# Patient Record
Sex: FEMALE | Race: WHITE | NOT HISPANIC OR LATINO | Employment: UNEMPLOYED | URBAN - METROPOLITAN AREA
[De-identification: names, ages, dates, MRNs, and addresses within clinical notes are randomized per-mention and may not be internally consistent; named-entity substitution may affect disease eponyms.]

---

## 2017-02-21 ENCOUNTER — HOSPITAL ENCOUNTER (EMERGENCY)
Facility: MEDICAL CENTER | Age: 22
End: 2017-02-21
Attending: EMERGENCY MEDICINE
Payer: MEDICAID

## 2017-02-21 VITALS
HEART RATE: 94 BPM | BODY MASS INDEX: 29.4 KG/M2 | HEIGHT: 68 IN | OXYGEN SATURATION: 97 % | TEMPERATURE: 98.8 F | RESPIRATION RATE: 18 BRPM | WEIGHT: 194 LBS | SYSTOLIC BLOOD PRESSURE: 127 MMHG | DIASTOLIC BLOOD PRESSURE: 81 MMHG

## 2017-02-21 DIAGNOSIS — L02.91 ABSCESS: ICD-10-CM

## 2017-02-21 PROCEDURE — 99284 EMERGENCY DEPT VISIT MOD MDM: CPT

## 2017-02-21 PROCEDURE — A9270 NON-COVERED ITEM OR SERVICE: HCPCS | Performed by: EMERGENCY MEDICINE

## 2017-02-21 PROCEDURE — 700102 HCHG RX REV CODE 250 W/ 637 OVERRIDE(OP): Performed by: EMERGENCY MEDICINE

## 2017-02-21 RX ORDER — SULFAMETHOXAZOLE AND TRIMETHOPRIM 800; 160 MG/1; MG/1
1 TABLET ORAL 2 TIMES DAILY
Qty: 20 TAB | Refills: 0 | Status: SHIPPED | OUTPATIENT
Start: 2017-02-21 | End: 2017-03-03

## 2017-02-21 RX ORDER — SULFAMETHOXAZOLE AND TRIMETHOPRIM 800; 160 MG/1; MG/1
1 TABLET ORAL ONCE
Status: COMPLETED | OUTPATIENT
Start: 2017-02-21 | End: 2017-02-21

## 2017-02-21 RX ORDER — HYDROCODONE BITARTRATE AND ACETAMINOPHEN 5; 325 MG/1; MG/1
1 TABLET ORAL ONCE
Status: COMPLETED | OUTPATIENT
Start: 2017-02-21 | End: 2017-02-21

## 2017-02-21 RX ORDER — CEPHALEXIN 250 MG/1
500 CAPSULE ORAL ONCE
Status: COMPLETED | OUTPATIENT
Start: 2017-02-21 | End: 2017-02-21

## 2017-02-21 RX ORDER — CEPHALEXIN 500 MG/1
500 CAPSULE ORAL 4 TIMES DAILY
Qty: 40 CAP | Refills: 0 | Status: SHIPPED | OUTPATIENT
Start: 2017-02-21 | End: 2017-03-03

## 2017-02-21 RX ORDER — OXYCODONE HYDROCHLORIDE AND ACETAMINOPHEN 5; 325 MG/1; MG/1
1-2 TABLET ORAL EVERY 6 HOURS PRN
Qty: 12 TAB | Refills: 0 | Status: SHIPPED | OUTPATIENT
Start: 2017-02-21 | End: 2017-12-18

## 2017-02-21 RX ADMIN — SULFAMETHOXAZOLE AND TRIMETHOPRIM 1 TABLET: 800; 160 TABLET ORAL at 21:53

## 2017-02-21 RX ADMIN — CEPHALEXIN 500 MG: 250 CAPSULE ORAL at 21:53

## 2017-02-21 RX ADMIN — HYDROCODONE BITARTRATE AND ACETAMINOPHEN 1 TABLET: 5; 325 TABLET ORAL at 21:53

## 2017-02-21 ASSESSMENT — PAIN SCALES - GENERAL
PAINLEVEL_OUTOF10: 10
PAINLEVEL_OUTOF10: 5

## 2017-02-21 NOTE — ED AVS SNAPSHOT
2/21/2017          Tia Marie Cryer  [address] [place] [id number]    Dear Brandee:    [facility] wants to ensure your discharge home is safe and you or your loved ones have had all your questions answered regarding your care after you leave the hospital.    You may receive a telephone call within two days of your discharge.  This call is to make certain you understand your discharge instructions as well as ensure we provided you with the best care possible during your stay with us.     The call will only last approximately 3-5 minutes and will be done by a nurse.    Once again, we want to ensure your discharge home is safe and that you have a clear understanding of any next steps in your care.  If you have any questions or concerns, please do not hesitate to contact us, we are here for you.  Thank you for choosing [facility] for your healthcare needs.    Sincerely,    Sam Holloway    [facility]

## 2017-02-21 NOTE — ED AVS SNAPSHOT
Home Care Instructions                                                                                                                Tia Marie Cryer   MRN: [id number]    Department:  [facility], Emergency Dept   Date of Visit:  2/21/2017            [facility], Emergency Dept    [address]    [place] [address]    Phone:  [phone]      You were seen by     Filippo Duong M.D.      Your Diagnosis Was     Abscess     L02.91       These are the medications you received during your hospitalization from 02/21/2017 1843 to 02/21/2017 2205     Date/Time Order Dose Route Action    02/21/2017 2153 sulfamethoxazole-trimethoprim (BACTRIM DS) 800-160 MG tablet 1 Tab 1 Tab Oral Given    02/21/2017 2153 cephALEXin (KEFLEX) capsule 500 mg 500 mg Oral Given    02/21/2017 2153 hydrocodone-acetaminophen (NORCO) 5-325 MG per tablet 1 Tab 1 Tab Oral Given      Follow-up Information     1. Follow up with [facility], Emergency Dept.    Specialty:  Emergency Medicine    Why:  any time for further treatment or evaluation    Contact information    [id number] [id number] [phone]      Medication Information     Review all of your home medications and newly ordered medications with your primary doctor and/or pharmacist as soon as possible. Follow medication instructions as directed by your doctor and/or pharmacist.     Please keep your complete medication list with you and share with your physician. Update the information when medications are discontinued, doses are changed, or new medications (including over-the-counter products) are added; and carry medication information at all times in the event of emergency situations.               Medication List      START taking these medications        Instructions    cephALEXin 500 MG Caps   Commonly known as:  KEFLEX    Take 1 Cap by mouth 4 times a day for 10 days.   Dose:  500  mg       oxycodone-acetaminophen 5-325 MG Tabs   Commonly known as:  PERCOCET    Take 1-2 Tabs by mouth every 6 hours as needed (pain).   Dose:  1-2 Tab       sulfamethoxazole-trimethoprim 800-160 MG tablet   Commonly known as:  BACTRIM DS    Take 1 Tab by mouth 2 times a day for 10 days.   Dose:  1 Tab         ASK your doctor about these medications        Instructions    Adapalene-Benzoyl Peroxide 0.1-2.5 % Gel    Apply to face bid       albuterol 108 (90 BASE) MCG/ACT Aers inhalation aerosol    Inhale 2 Puffs by mouth every 6 hours as needed for Shortness of Breath.   Dose:  2 Puff       doxycycline 100 MG Tabs   Commonly known as:  VIBRAMYCIN    Take 1 Tab by mouth 2 times a day.   Dose:  100 mg       fluocinonide 0.05 % Crea   Commonly known as:  LIDEX    Apply to rash bid       fluticasone-salmeterol 100-50 MCG/DOSE Aepb   Commonly known as:  ADVAIR    Inhale 1 Puff by mouth every 12 hours.   Dose:  1 Puff                 Discharge Instructions       Please have us or your doctor look at it on Thursday.  Please return at any time if you would like it drained.    Abscess  An abscess is an infected area that contains a collection of pus and debris. It can occur in almost any part of the body. An abscess is also known as a furuncle or boil.  CAUSES   An abscess occurs when tissue gets infected. This can occur from blockage of oil or sweat glands, infection of hair follicles, or a minor injury to the skin. As the body tries to fight the infection, pus collects in the area and creates pressure under the skin. This pressure causes pain. People with weakened immune systems have difficulty fighting infections and get certain abscesses more often.   SYMPTOMS  Usually an abscess develops on the skin and becomes a painful mass that is red, warm, and tender. If the abscess forms under the skin, you may feel a moveable soft area under the skin. Some abscesses break open (rupture) on their own, but most will continue to get  worse without care. The infection can spread deeper into the body and eventually into the bloodstream, causing you to feel ill.   DIAGNOSIS   Your caregiver will take your medical history and perform a physical exam. A sample of fluid may also be taken from the abscess to determine what is causing your infection.  TREATMENT   Your caregiver may prescribe antibiotic medicines to fight the infection. However, taking antibiotics alone usually does not cure an abscess. Your caregiver may need to make a small cut (incision) in the abscess to drain the pus. In some cases, gauze is packed into the abscess to reduce pain and to continue draining the area.  HOME CARE INSTRUCTIONS   · Only take over-the-counter or prescription medicines for pain, discomfort, or fever as directed by your caregiver.  · If you were prescribed antibiotics, take them as directed. Finish them even if you start to feel better.  · If gauze is used, follow your caregiver's directions for changing the gauze.  · To avoid spreading the infection:  ¨ Keep your draining abscess covered with a bandage.  ¨ Wash your hands well.  ¨ Do not share personal care items, towels, or whirlpools with others.  ¨ Avoid skin contact with others.  · Keep your skin and clothes clean around the abscess.  · Keep all follow-up appointments as directed by your caregiver.  SEEK MEDICAL CARE IF:   · You have increased pain, swelling, redness, fluid drainage, or bleeding.  · You have muscle aches, chills, or a general ill feeling.  · You have a fever.  MAKE SURE YOU:   · Understand these instructions.  · Will watch your condition.  · Will get help right away if you are not doing well or get worse.     This information is not intended to replace advice given to you by your health care provider. Make sure you discuss any questions you have with your health care provider.     Document Released: 09/27/2006 Document Revised: 06/18/2013 Document Reviewed: 03/01/2013  Karthik  Interactive Patient Education ©2016 [employer]            Patient Information     Patient Information    Following emergency treatment: all patient requiring follow-up care must return either to a private physician or a clinic if your condition worsens before you are able to obtain further medical attention, please return to the emergency room.     Billing Information    At [facility], we work to make the billing process streamlined for our patients.  Our Representatives are here to answer any questions you may have regarding your hospital bill.  If you have insurance coverage and have supplied your insurance information to us, we will submit a claim to your insurer on your behalf.  Should you have any questions regarding your bill, we can be reached online or by phone as follows:  Online: You are able pay your bills online or live chat with our representatives about any billing questions you may have. We are here to help Monday - Friday from 8:00am to 7:30pm and 9:00am - 12:00pm on Saturdays.  Please visit [email or web address]  for more information.   Phone:  [phone] or [phone]    Please note that your emergency physician, surgeon, pathologist, radiologist, anesthesiologist, and other specialists are not employed by [facility] and will therefore bill separately for their services.  Please contact them directly for any questions concerning their bills at the numbers below:     Emergency Physician Services:  [phone]  [facility]:  [phone]  [facility]:  [phone]  [facility]:  [phone]    1. Your final bill may vary from the amount quoted upon discharge if all procedures are not complete at that time, or if your doctor has additional procedures of which we are not aware. You will receive an additional bill if you return to the Emergency Department at [facility] for suture removal regardless of the  facility of which the sutures were placed.     2. Please arrange for settlement of this account at the emergency registration.    3. All self-pay accounts are due in full at the time of treatment.  If you are unable to meet this obligation then payment is expected within 4-5 days.     4. If you have had radiology studies (CT, X-ray, Ultrasound, MRI), you have received a preliminary result during your emergency department visit. Please contact the radiology department [phone] to receive a copy of your final result. Please discuss the Final result with your primary physician or with the follow up physician provided.     Crisis Hotline:  [place] Crisis Hotline:  5-820-TGSWYGJ or 1-607.440.6392  [place] Crisis Hotline:    1-325.408.3522 or 958-819-7190         ED Discharge Follow Up Questions    1. In order to provide you with very good care, we would like to follow up with a phone call in the next few days.  May we have your permission to contact you?     YES /  NO    2. What is the best phone number to call you? (       )_____-__________    3. What is the best time to call you?      Morning  /  Afternoon  /  Evening                   Patient Signature:  ____________________________________________________________    Date:  ____________________________________________________________

## 2017-02-21 NOTE — ED AVS SNAPSHOT
eCircle Access Code: Activation code not generated  Current eCircle Status: Active    [employer]  A secure, online tool to manage your health information     "[employer]’s eCircle® is a secure, online tool that connects you to your personalized health information from the privacy of your home -- day or night - making it very easy for you to manage your healthcare. Once the activation process is completed, you can even access your medical information using the eCircle sara, which is available for free in the Apple Sara store or Google Play store.     eCircle provides the following levels of access (as shown below):   My Chart Features   [facility] Primary Care Doctor [facility]  Specialists [facility]  Urgent  Care Non-[facility]  Primary Care  Doctor   Email your healthcare team securely and privately 24/7 X X X X   Manage appointments: schedule your next appointment; view details of past/upcoming appointments X      Request prescription refills. X      View recent personal medical records, including lab and immunizations X X X X   View health record, including health history, allergies, medications X X X X   Read reports about your outpatient visits, procedures, consult and ER notes X X X X   See your discharge summary, which is a recap of your hospital and/or ER visit that includes your diagnosis, lab results, and care plan. X X       How to register for eCircle:  1. Go to  [email or web address].  2. Click on the Sign Up Now box, which takes you to the New Member Sign Up page. You will need to provide the following information:  a. Enter your eCircle Access Code exactly as it appears at the top of this page. (You will not need to use this code after you’ve completed the sign-up process. If you do not sign up before the expiration date, you must request a new code.)   b. Enter your date of birth.   c. Enter your home email address.   d. Click Submit, and follow the next screen’s instructions.  3. Create a eCircle ID. This will  be your [employer] login ID and cannot be changed, so think of one that is secure and easy to remember.  4. Create a [employer] Computing password. You can change your password at any time.  5. Enter your Password Reset Question and Answer. This can be used at a later time if you forget your password.   6. Enter your e-mail address. This allows you to receive e-mail notifications when new information is available in [employer].  7. Click Sign Up. You can now view your health information.    For assistance activating your [employer] Computing account, call [phone]

## 2017-02-22 NOTE — ED PROVIDER NOTES
"ED Provider Note    CHIEF COMPLAINT   Chief Complaint   Patient presents with   • Wound Infection     Pt states redness to her coccyx area for the last 2 days, pt states \"it is painful\" and \"I am unable to seat for long periods\"       HPI   Tia Marie Cryer is a 21 y.o. female who presents with a chief complaint of pain and redness and swelling. It is above the buttocks. Duration has been for 2 days. It is worse when she puts pressure on it. It is better when she is lying on her stomach. There is no associated fever or chills. No discharge. No trauma.    Patient was asleep initially when she awoke she was tearful stating that she did not want to have incision and drainage.     REVIEW OF SYSTEMS   General: No fever or chills.  Dermatologic: See above  Neurologic: No weakness or numbness.  Psychiatric: Positive for anxiety          PAST MEDICAL HISTORY   Past Medical History   Diagnosis Date   • Rapid or irregular heartbeat      per pt double heartbeat when she was a child   • Back pain 7/22/2013   • Lumbar back pain 7/22/2013   • Allergy, unspecified not elsewhere classified    • Anxiety    • Depression    • Arrhythmia    • GERD (gastroesophageal reflux disease)    • Headache(784.0)    • Headache, classical migraine    • IBD (inflammatory bowel disease)    • Substance abuse      opiates   • Urinary tract infection, site not specified        FAMILY HISTORY  Family History   Problem Relation Age of Onset   • Cancer Father      melanoma   • Arthritis Father      gout   • Diabetes Maternal Aunt    • Heart Disease Maternal Aunt    • Hypertension Maternal Aunt    • Hyperlipidemia Maternal Aunt    • Diabetes Maternal Grandmother    • Stroke Maternal Grandmother    • Heart Disease Maternal Grandmother    • Hypertension Maternal Grandmother    • Hyperlipidemia Maternal Grandmother    • Cancer Maternal Grandmother      breast   • Diabetes Mother    • Heart Disease Mother    • Hypertension Mother    • Hyperlipidemia Mother    • " "Lung Disease Mother      smoker   • Alcohol/Drug Mother      etoh   • Psychiatry Sister      anxiety   • Heart Disease Maternal Grandfather    • Hypertension Maternal Grandfather    • Hyperlipidemia Maternal Grandfather    • Arthritis Paternal Grandmother      osteoporosis   • Psychiatry Paternal Grandfather      sucide   • Psychiatry Sister      depression   • Lung Disease Maternal Uncle      Throat, lung   • Cancer Maternal Uncle      throat, lung   • Alcohol/Drug Maternal Uncle      etoh       SOCIAL HISTORY  Social History     Social History   • Marital Status: Single     Spouse Name: N/A   • Number of Children: N/A   • Years of Education: N/A     Social History Main Topics   • Smoking status: Current Every Day Smoker -- 1.00 packs/day for 5 years     Types: Cigarettes   • Smokeless tobacco: Never Used   • Alcohol Use: No      Comment: 2x month   • Drug Use: No   • Sexual Activity:     Partners: Male      Comment: nothing used-want pregnancy     Other Topics Concern   • None     Social History Narrative    ** Merged History Encounter **             SURGICAL HISTORY  History reviewed. No pertinent past surgical history.    CURRENT MEDICATIONS   Home Medications     Reviewed by Theresa Devi R.N. (Registered Nurse) on 02/21/17 at [facility]  Med List Status: Partial    Medication Last Dose Status    Adapalene-Benzoyl Peroxide 0.1-2.5 % GEL  Active    albuterol (VENTOLIN OR PROVENTIL) 108 (90 BASE) MCG/ACT AERS inhalation aerosol  Active    doxycycline (VIBRAMYCIN) 100 MG TABS  Active    fluocinonide (LIDEX) 0.05 % CREA  Active    fluticasone-salmeterol (ADVAIR) 100-50 MCG/DOSE AEPB  Active                ALLERGIES   No Known Allergies    PHYSICAL EXAM  VITAL SIGNS: /80 mmHg  Pulse 99  Temp(Src) 37.1 °C (98.8 °F)  Resp 20  Ht 1.727 m (5' 8\")  Wt 88 kg (194 lb 0.1 oz)  BMI 29.51 kg/m2  SpO2 93%  LMP 01/10/2017 Room air O2: 93    Constitutional: Well developed, Well nourished, No acute distress, Non-toxic " appearance.   Cardiovascular: Normal heart rate, Normal rhythm, No murmurs, No rubs, No gallops.   Thorax & Lungs: Normal breath sounds, No respiratory distress, No wheezing, No chest tenderness.   Skin: Patient has indurated area just above the crease of the gluteus area. She has induration noted. There appears to be small sore on the top active drainage noted. No streaking noted.  Extremities: Intact distal pulses, No edema, No tenderness, No cyanosis, No clubbing.       COURSE & MEDICAL DECISION MAKING  Pertinent Labs & Imaging studies reviewed. (See chart for details)   is here with what appears to be an abscess. At this point, incision and drainage was prepped and the patient converted tearful and anxious. Sat down and we spoke to the patient for approximately 10 minutes regarding the procedure well we did it while redoing it. Patient this point is awake and alert she is anxious but her train of thought is appropriate. She does not want to do the incision and drainage. She'll try antibiotics. I discussed with her that this may not work and occasionally, the infection could spread and cause severe issues including death in the worse case scenario. At this point patient is encouraged to return any time. She is given pain medicine and antibiotics and still refused upon repeat evaluation. I spoke her to return anytime she wants for repeat evaluation and will gladly look at it to make sure tenting in the right direction. She can always follow up with her primary care physician if she feels more comfortable like to watch it closely in the next day or 2. Again patient at this point is appropriate she is awake she is alert to be discharged home. Antibiotics pain medicine    FINAL IMPRESSION  1. Abscess  2.   3.      Electronically signed by: Filippo Duong, 2/21/2017 9:39 PM

## 2017-02-22 NOTE — ED NOTES
"Chief Complaint   Patient presents with   • Wound Infection     Pt states redness to her coccyx area for the last 2 days, pt states \"it is painful\" and \"I am unable to seat for long periods\"     .Blood pressure 125/80, pulse 99, temperature 37.1 °C (98.8 °F), resp. rate 20, height 1.727 m (5' 8\"), weight 88 kg (194 lb 0.1 oz), SpO2 93 %.    "

## 2017-02-22 NOTE — DISCHARGE INSTRUCTIONS
Please have us or your doctor look at it on Thursday.  Please return at any time if you would like it drained.    Abscess  An abscess is an infected area that contains a collection of pus and debris. It can occur in almost any part of the body. An abscess is also known as a furuncle or boil.  CAUSES   An abscess occurs when tissue gets infected. This can occur from blockage of oil or sweat glands, infection of hair follicles, or a minor injury to the skin. As the body tries to fight the infection, pus collects in the area and creates pressure under the skin. This pressure causes pain. People with weakened immune systems have difficulty fighting infections and get certain abscesses more often.   SYMPTOMS  Usually an abscess develops on the skin and becomes a painful mass that is red, warm, and tender. If the abscess forms under the skin, you may feel a moveable soft area under the skin. Some abscesses break open (rupture) on their own, but most will continue to get worse without care. The infection can spread deeper into the body and eventually into the bloodstream, causing you to feel ill.   DIAGNOSIS   Your caregiver will take your medical history and perform a physical exam. A sample of fluid may also be taken from the abscess to determine what is causing your infection.  TREATMENT   Your caregiver may prescribe antibiotic medicines to fight the infection. However, taking antibiotics alone usually does not cure an abscess. Your caregiver may need to make a small cut (incision) in the abscess to drain the pus. In some cases, gauze is packed into the abscess to reduce pain and to continue draining the area.  HOME CARE INSTRUCTIONS   · Only take over-the-counter or prescription medicines for pain, discomfort, or fever as directed by your caregiver.  · If you were prescribed antibiotics, take them as directed. Finish them even if you start to feel better.  · If gauze is used, follow your caregiver's directions for  changing the gauze.  · To avoid spreading the infection:  ¨ Keep your draining abscess covered with a bandage.  ¨ Wash your hands well.  ¨ Do not share personal care items, towels, or whirlpools with others.  ¨ Avoid skin contact with others.  · Keep your skin and clothes clean around the abscess.  · Keep all follow-up appointments as directed by your caregiver.  SEEK MEDICAL CARE IF:   · You have increased pain, swelling, redness, fluid drainage, or bleeding.  · You have muscle aches, chills, or a general ill feeling.  · You have a fever.  MAKE SURE YOU:   · Understand these instructions.  · Will watch your condition.  · Will get help right away if you are not doing well or get worse.     This information is not intended to replace advice given to you by your health care provider. Make sure you discuss any questions you have with your health care provider.     Document Released: 09/27/2006 Document Revised: 06/18/2013 Document Reviewed: 03/01/2013  [employer] Interactive Patient Education ©2016 [employer]

## 2017-12-18 ENCOUNTER — HOSPITAL ENCOUNTER (EMERGENCY)
Facility: MEDICAL CENTER | Age: 22
End: 2017-12-18
Attending: EMERGENCY MEDICINE
Payer: MEDICAID

## 2017-12-18 VITALS
RESPIRATION RATE: 18 BRPM | WEIGHT: 208.78 LBS | HEART RATE: 86 BPM | DIASTOLIC BLOOD PRESSURE: 78 MMHG | BODY MASS INDEX: 30.92 KG/M2 | TEMPERATURE: 99 F | SYSTOLIC BLOOD PRESSURE: 138 MMHG | HEIGHT: 69 IN | OXYGEN SATURATION: 99 %

## 2017-12-18 DIAGNOSIS — L85.3 XEROSIS OF SKIN: ICD-10-CM

## 2017-12-18 DIAGNOSIS — F41.9 ANXIETY: ICD-10-CM

## 2017-12-18 DIAGNOSIS — L73.9 FOLLICULITIS: ICD-10-CM

## 2017-12-18 PROCEDURE — 700102 HCHG RX REV CODE 250 W/ 637 OVERRIDE(OP): Performed by: EMERGENCY MEDICINE

## 2017-12-18 PROCEDURE — 99283 EMERGENCY DEPT VISIT LOW MDM: CPT

## 2017-12-18 PROCEDURE — A9270 NON-COVERED ITEM OR SERVICE: HCPCS | Performed by: EMERGENCY MEDICINE

## 2017-12-18 RX ORDER — CEPHALEXIN 500 MG/1
500 CAPSULE ORAL 3 TIMES DAILY
Qty: 15 CAP | Refills: 0 | Status: SHIPPED | OUTPATIENT
Start: 2017-12-18 | End: 2017-12-18

## 2017-12-18 RX ORDER — DIPHENHYDRAMINE HCL 25 MG
25 TABLET ORAL ONCE
Status: COMPLETED | OUTPATIENT
Start: 2017-12-18 | End: 2017-12-18

## 2017-12-18 RX ORDER — IBUPROFEN 600 MG/1
600 TABLET ORAL ONCE
Status: COMPLETED | OUTPATIENT
Start: 2017-12-18 | End: 2017-12-18

## 2017-12-18 RX ORDER — CEPHALEXIN 500 MG/1
500 CAPSULE ORAL 3 TIMES DAILY
Qty: 15 CAP | Refills: 0 | Status: SHIPPED | OUTPATIENT
Start: 2017-12-18 | End: 2017-12-23

## 2017-12-18 RX ORDER — IBUPROFEN 600 MG/1
600 TABLET ORAL EVERY 6 HOURS PRN
Qty: 30 TAB | Refills: 0 | Status: SHIPPED | OUTPATIENT
Start: 2017-12-18 | End: 2017-12-18

## 2017-12-18 RX ORDER — KETOCONAZOLE 20 MG/G
1 CREAM TOPICAL 2 TIMES DAILY
Qty: 1 TUBE | Refills: 1 | Status: SHIPPED | OUTPATIENT
Start: 2017-12-18

## 2017-12-18 RX ORDER — CETIRIZINE HYDROCHLORIDE 10 MG/1
10 TABLET ORAL DAILY
Qty: 30 TAB | Refills: 0 | Status: SHIPPED | OUTPATIENT
Start: 2017-12-18

## 2017-12-18 RX ORDER — IBUPROFEN 600 MG/1
600 TABLET ORAL EVERY 6 HOURS PRN
Qty: 30 TAB | Refills: 0 | Status: SHIPPED | OUTPATIENT
Start: 2017-12-18 | End: 2019-06-11

## 2017-12-18 RX ORDER — KETOCONAZOLE 20 MG/G
1 CREAM TOPICAL 2 TIMES DAILY
Qty: 1 TUBE | Refills: 1 | Status: SHIPPED | OUTPATIENT
Start: 2017-12-18 | End: 2017-12-18

## 2017-12-18 RX ADMIN — DIPHENHYDRAMINE HCL 25 MG: 25 TABLET ORAL at 22:37

## 2017-12-18 RX ADMIN — IBUPROFEN 600 MG: 600 TABLET, FILM COATED ORAL at 22:37

## 2017-12-18 ASSESSMENT — PAIN SCALES - GENERAL
PAINLEVEL_OUTOF10: 2
PAINLEVEL_OUTOF10: 2

## 2017-12-19 NOTE — DISCHARGE INSTRUCTIONS
Folliculitis  Folliculitis is redness, soreness, and swelling (inflammation) of the hair follicles. This condition can occur anywhere on the body. People with weakened immune systems, diabetes, or obesity have a greater risk of getting folliculitis.  CAUSES  · Bacterial infection. This is the most common cause.  · Fungal infection.  · Viral infection.  · Contact with certain chemicals, especially oils and tars.  Long-term folliculitis can result from bacteria that live in the nostrils. The bacteria may trigger multiple outbreaks of folliculitis over time.  SYMPTOMS  Folliculitis most commonly occurs on the scalp, thighs, legs, back, buttocks, and areas where hair is shaved frequently. An early sign of folliculitis is a small, white or yellow, pus-filled, itchy lesion (pustule). These lesions appear on a red, inflamed follicle. They are usually less than 0.2 inches (5 mm) wide. When there is an infection of the follicle that goes deeper, it becomes a boil or furuncle. A group of closely packed boils creates a larger lesion (carbuncle). Carbuncles tend to occur in hairy, sweaty areas of the body.  DIAGNOSIS   Your caregiver can usually tell what is wrong by doing a physical exam. A sample may be taken from one of the lesions and tested in a lab. This can help determine what is causing your folliculitis.  TREATMENT   Treatment may include:  · Applying warm compresses to the affected areas.  · Taking antibiotic medicines orally or applying them to the skin.  · Draining the lesions if they contain a large amount of pus or fluid.  · Laser hair removal for cases of long-lasting folliculitis. This helps to prevent regrowth of the hair.  HOME CARE INSTRUCTIONS  · Apply warm compresses to the affected areas as directed by your caregiver.  · If antibiotics are prescribed, take them as directed. Finish them even if you start to feel better.  · You may take over-the-counter medicines to relieve itching.  · Do not shave irritated  skin.  · Follow up with your caregiver as directed.  SEEK IMMEDIATE MEDICAL CARE IF:   · You have increasing redness, swelling, or pain in the affected area.  · You have a fever.  MAKE SURE YOU:  · Understand these instructions.  · Will watch your condition.  · Will get help right away if you are not doing well or get worse.     This information is not intended to replace advice given to you by your health care provider. Make sure you discuss any questions you have with your health care provider.     Document Released: 02/26/2003 Document Revised: 01/08/2016 Document Reviewed: 03/19/2013  [employer] Interactive Patient Education ©2016 [employer]

## 2017-12-19 NOTE — ED PROVIDER NOTES
"ED Provider Note    CHIEF COMPLAINT  Chief Complaint   Patient presents with   • Rash     Seen at 10:13 PM    HPI  Tia Marie Cryer is a 22 y.o. female who presentsWith a diffusely pruritic rash across her entire body, she states this is been present since birth. She notes it is worsened over the past few weeks, she denies any new lotions or detergents. She does constant scratch these rashes. She states that she tried some over-the-counter antifungal that did improve the rash. She also states a long time ago she was examined with a ultraviolet light and was told that she had a diffuse body yeast rash.  She also reports painful boils on her bilateral thighs, she states the pain gets so severe that she can barely walk.    REVIEW OF SYSTEMS  See HPI, denies fevers, chills, new medications.  She states she is allergic to sugar.  PAST MEDICAL HISTORY   has a past medical history of Allergy, unspecified not elsewhere classified; Anxiety; Arrhythmia; Back pain (7/22/2013); Depression; GERD (gastroesophageal reflux disease); Headache(784.0); Headache, classical migraine; IBD (inflammatory bowel disease); Lumbar back pain (7/22/2013); Rapid or irregular heartbeat; Substance abuse; and Urinary tract infection, site not specified.    SOCIAL HISTORY  Social History     Social History Main Topics   • Smoking status: Current Every Day Smoker     Packs/day: 1.00     Years: 5.00     Types: Cigarettes   • Smokeless tobacco: Never Used   • Alcohol use Yes      Comment: soc   • Drug use: No   • Sexual activity: Yes     Partners: Male      Comment: nothing used-want pregnancy       SURGICAL HISTORY  patient denies any surgical history    CURRENT MEDICATIONS  Reviewed.  See Encounter Summary.     ALLERGIES  No Known Allergies    PHYSICAL EXAM  VITAL SIGNS: /78   Pulse 86   Temp 37.2 °C (99 °F)   Resp 18   Ht 1.753 m (5' 9\")   Wt 94.7 kg (208 lb 12.4 oz)   SpO2 99%   BMI 30.83 kg/m²   Constitutional: Awake, alert in no " apparent distress.  HENT: Normocephalic, Bilateral external ears normal. Nose normal.No oral pharyngeal involvement.   Eyes: Conjunctiva normal, non-icteric, EOMI.    Thorax & Lungs: Easy unlabored respirations  Cardiovascular:    Abdomen:  No distention  Skin: 3 distinct 1 cm papules, 2 on the left medial thigh, one on the right medial thigh. No surrounding cellulitis. Mild fluctuance noted.  No discernible diffuse body rash was seen, there is mild erythema in the bilateral flanks that appear to be excoriated.   Extremities:   atraumatic   Neurologic: Alert, Grossly non-focal.   Psychiatric: Affect and Mood normal    RADIOLOGY  No orders to display     The radiologist's interpretation of all radiological studies have been reviewed by me.  Nursing notes and vital signs were reviewed. (See chart for details)    Decision Making:  This is a 22 y.o. year old female who presents withComplaints of a rash across her entire body since birth. I do not see any rash, this appears to be cirrhosis. I suspect the patient has some psychogenic scratching that is occurring and causing the erythema. The patient is asking for an antifungal cream, she will be given some ketoconazole but I do not see any evidence of candidate at this time. Tenia corporis seems very unlikely as well. There is not any hypo-pigmented lesions or scaling noted.    She does have 3 small areas of folliculitis. At this time there is no significant fluctuance that would require incision and drainage however  I explained to patient this may develop over the next 48 hours. I do recommend warm compresses and topical antibiotics in addition to Keflex.    She should obtain a primary care physician.  The patient's blood pressure is elevated today. >120/80. I have referred them to primary care for follow up.       DISPOSITION:  Patient will be discharged home in good condition.    Discharge Medications:  Discharge Medication List as of 12/18/2017 11:03 PM      START  taking these medications    Details   mupirocin (BACTROBAN) 2 % Ointment Apply 1 Application to affected area(s) 3 times a day.Apply to painful lesionsDisp-1 Tube, R-0, Normal      cetirizine (ZYRTEC) 10 MG Tab Take 1 Tab by mouth every day., Disp-30 Tab, R-0, Print Rx Paper      ibuprofen (MOTRIN) 600 MG Tab Take 1 Tab by mouth every 6 hours as needed for Moderate Pain., Disp-30 Tab, R-0, Print Rx Paper      ketoconazole (NIZORAL) 2 % Cream Apply 1 Application to affected area(s) 2 times a day., Disp-1 Tube, R-1, Print Rx Paper      cephALEXin (KEFLEX) 500 MG Cap Take 1 Cap by mouth 3 times a day for 5 days., Disp-15 Cap, R-0, Print Rx Paper             The patient was discharged home (see d/c instructions) and told to return immediately for any signs or symptoms listed, or any worsening at all.  The patient verbally agreed to the discharge precautions and follow-up plan which is documented in EPIC.    FINAL IMPRESSION  1. Folliculitis    2. Xerosis of skin    3. Anxiety
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
## 2018-11-27 ENCOUNTER — NON-PROVIDER VISIT (OUTPATIENT)
Dept: URGENT CARE | Facility: PHYSICIAN GROUP | Age: 23
End: 2018-11-27

## 2018-11-27 DIAGNOSIS — Z02.1 PRE-EMPLOYMENT DRUG SCREENING: ICD-10-CM

## 2018-11-27 LAB
AMP AMPHETAMINE: NORMAL
COC COCAINE: NORMAL
INT CON NEG: NORMAL
INT CON POS: NORMAL
MET METHAMPHETAMINES: NORMAL
OPI OPIATES: NORMAL
PCP PHENCYCLIDINE: NORMAL
POC DRUG COMMENT 753798-OCCUPATIONAL HEALTH: NEGATIVE
THC: NORMAL

## 2018-11-27 PROCEDURE — 80305 DRUG TEST PRSMV DIR OPT OBS: CPT | Performed by: PHYSICIAN ASSISTANT

## 2019-01-16 ENCOUNTER — HOSPITAL ENCOUNTER (EMERGENCY)
Facility: MEDICAL CENTER | Age: 24
End: 2019-01-16
Attending: EMERGENCY MEDICINE
Payer: MEDICAID

## 2019-01-16 VITALS
HEIGHT: 68 IN | SYSTOLIC BLOOD PRESSURE: 144 MMHG | WEIGHT: 194 LBS | DIASTOLIC BLOOD PRESSURE: 97 MMHG | RESPIRATION RATE: 20 BRPM | HEART RATE: 101 BPM | OXYGEN SATURATION: 99 % | BODY MASS INDEX: 29.4 KG/M2 | TEMPERATURE: 96.8 F

## 2019-01-16 DIAGNOSIS — F11.93 OPIOID WITHDRAWAL (HCC): ICD-10-CM

## 2019-01-16 PROCEDURE — 99283 EMERGENCY DEPT VISIT LOW MDM: CPT

## 2019-01-16 RX ORDER — CITALOPRAM 20 MG/1
20 TABLET ORAL DAILY
Qty: 30 TAB | Refills: 0 | Status: SHIPPED | OUTPATIENT
Start: 2019-01-16

## 2019-01-16 RX ORDER — CLONIDINE HYDROCHLORIDE 0.1 MG/1
0.1 TABLET ORAL 3 TIMES DAILY PRN
Qty: 10 TAB | Refills: 0 | Status: SHIPPED | OUTPATIENT
Start: 2019-01-16

## 2019-01-16 RX ORDER — BUPROPION HYDROCHLORIDE 100 MG/1
100 TABLET ORAL 2 TIMES DAILY
Qty: 60 TAB | Refills: 0 | Status: SHIPPED | OUTPATIENT
Start: 2019-01-16 | End: 2019-01-16

## 2019-01-16 RX ORDER — BUPROPION HYDROCHLORIDE 100 MG/1
100 TABLET ORAL 2 TIMES DAILY
Qty: 60 TAB | Refills: 0 | Status: SHIPPED | OUTPATIENT
Start: 2019-01-16

## 2019-01-16 RX ORDER — CITALOPRAM 20 MG/1
20 TABLET ORAL DAILY
Qty: 30 TAB | Refills: 0 | Status: SHIPPED | OUTPATIENT
Start: 2019-01-16 | End: 2019-01-16

## 2019-01-16 ASSESSMENT — PAIN SCALES - GENERAL: PAINLEVEL_OUTOF10: 0

## 2019-01-16 NOTE — ED TRIAGE NOTES
Chief Complaint   Patient presents with   • Medical Clearance     pt sent here from rehab/[facility], here to get medically cleared. pt detoxing from heroine. denies any si/hi. c/o feeling shaky. pt aaox4.     Also asking for med refill of her wellbutrin and citalopram. Denies si/hi. Triage process explained. Instructed to notify staff for any worsening symptoms.

## 2019-01-16 NOTE — ED NOTES
Pt discharged home as ordered by erp. Pt instructed to follow up with their PCP and return here as need. Pt given RXs. Pt verbalized understanding and left ambulating independently with family
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
## 2019-01-23 ENCOUNTER — HOSPITAL ENCOUNTER (EMERGENCY)
Facility: MEDICAL CENTER | Age: 24
End: 2019-01-23
Attending: EMERGENCY MEDICINE
Payer: MEDICAID

## 2019-01-23 VITALS
TEMPERATURE: 96.6 F | RESPIRATION RATE: 20 BRPM | HEIGHT: 68 IN | BODY MASS INDEX: 30.07 KG/M2 | OXYGEN SATURATION: 97 % | WEIGHT: 198.41 LBS | SYSTOLIC BLOOD PRESSURE: 132 MMHG | DIASTOLIC BLOOD PRESSURE: 79 MMHG | HEART RATE: 103 BPM

## 2019-01-23 DIAGNOSIS — S80.12XA CONTUSION OF LEFT LOWER LEG, INITIAL ENCOUNTER: ICD-10-CM
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
## 2019-01-23 PROCEDURE — 99284 EMERGENCY DEPT VISIT MOD MDM: CPT

## 2019-01-23 ASSESSMENT — LIFESTYLE VARIABLES: DO YOU DRINK ALCOHOL: NO

## 2019-01-23 NOTE — ED NOTES
This RN brought patient back from waiting room. Pt is alert, oriented and ambulatory with steady gait. This RN requested patient change into gown at this time. Pt compliant.
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
## 2019-01-23 NOTE — ED NOTES
Pt discharge ordered by provider. Pt discharge instructions reviewed with patient by this RN. Pt verbalized understanding of discharge information and of prescription information. Pt discharged alert, oriented, and ambulatory by this RN.

## 2019-06-11 ENCOUNTER — OFFICE VISIT (OUTPATIENT)
Dept: URGENT CARE | Facility: PHYSICIAN GROUP | Age: 24
End: 2019-06-11
Payer: MEDICAID

## 2019-06-11 VITALS
SYSTOLIC BLOOD PRESSURE: 118 MMHG | TEMPERATURE: 97.5 F | HEIGHT: 68 IN | WEIGHT: 204 LBS | DIASTOLIC BLOOD PRESSURE: 70 MMHG | HEART RATE: 92 BPM | BODY MASS INDEX: 30.92 KG/M2 | OXYGEN SATURATION: 100 % | RESPIRATION RATE: 16 BRPM

## 2019-06-11 DIAGNOSIS — K04.7 DENTAL INFECTION: ICD-10-CM

## 2019-06-11 DIAGNOSIS — K08.89 PAIN, DENTAL: ICD-10-CM

## 2019-06-11 PROCEDURE — 99204 OFFICE O/P NEW MOD 45 MIN: CPT | Performed by: PHYSICIAN ASSISTANT

## 2019-06-11 RX ORDER — CLINDAMYCIN HYDROCHLORIDE 300 MG/1
300 CAPSULE ORAL 3 TIMES DAILY
Qty: 30 CAP | Refills: 0 | Status: SHIPPED | OUTPATIENT
Start: 2019-06-11 | End: 2019-06-21

## 2019-06-11 RX ORDER — IBUPROFEN 800 MG/1
800 TABLET ORAL EVERY 8 HOURS PRN
Qty: 30 TAB | Refills: 0 | Status: SHIPPED | OUTPATIENT
Start: 2019-06-11

## 2019-06-11 NOTE — PATIENT INSTRUCTIONS
Steps to Quit Smoking  Smoking tobacco can be harmful to your health and can affect almost every organ in your body. Smoking puts you, and those around you, at risk for developing many serious chronic diseases. Quitting smoking is difficult, but it is one of the best things that you can do for your health. It is never too late to quit.  What are the benefits of quitting smoking?  When you quit smoking, you lower your risk of developing serious diseases and conditions, such as:  · Lung cancer or lung disease, such as COPD.  · Heart disease.  · Stroke.  · Heart attack.  · Infertility.  · Osteoporosis and bone fractures.  Additionally, symptoms such as coughing, wheezing, and shortness of breath may get better when you quit. You may also find that you get sick less often because your body is stronger at fighting off colds and infections. If you are pregnant, quitting smoking can help to reduce your chances of having a baby of low birth weight.  How do I get ready to quit?  When you decide to quit smoking, create a plan to make sure that you are successful. Before you quit:  · Pick a date to quit. Set a date within the next two weeks to give you time to prepare.  · Write down the reasons why you are quitting. Keep this list in places where you will see it often, such as on your bathroom mirror or in your car or wallet.  · Identify the people, places, things, and activities that make you want to smoke (triggers) and avoid them. Make sure to take these actions:  ¨ Throw away all cigarettes at home, at work, and in your car.  ¨ Throw away smoking accessories, such as ashtrays and lighters.  ¨ Clean your car and make sure to empty the ashtray.  ¨ Clean your home, including curtains and carpets.  · Tell your family, friends, and coworkers that you are quitting. Support from your loved ones can make quitting easier.  · Talk with your health care provider about your options for quitting smoking.  · Find out what treatment  options are covered by your health insurance.  What strategies can I use to quit smoking?  Talk with your healthcare provider about different strategies to quit smoking. Some strategies include:  · Quitting smoking altogether instead of gradually lessening how much you smoke over a period of time. Research shows that quitting “cold turkey” is more successful than gradually quitting.  · Attending in-person counseling to help you build problem-solving skills. You are more likely to have success in quitting if you attend several counseling sessions. Even short sessions of 10 minutes can be effective.  · Finding resources and support systems that can help you to quit smoking and remain smoke-free after you quit. These resources are most helpful when you use them often. They can include:  ¨ Online chats with a counselor.  ¨ Telephone quitlines.  ¨ Printed self-help materials.  ¨ Support groups or group counseling.  ¨ Text messaging programs.  ¨ Mobile phone applications.  · Taking medicines to help you quit smoking. (If you are pregnant or breastfeeding, talk with your health care provider first.) Some medicines contain nicotine and some do not. Both types of medicines help with cravings, but the medicines that include nicotine help to relieve withdrawal symptoms. Your health care provider may recommend:  ¨ Nicotine patches, gum, or lozenges.  ¨ Nicotine inhalers or sprays.  ¨ Non-nicotine medicine that is taken by mouth.  Talk with your health care provider about combining strategies, such as taking medicines while you are also receiving in-person counseling. Using these two strategies together makes you more likely to succeed in quitting than if you used either strategy on its own.  If you are pregnant or breastfeeding, talk with your health care provider about finding counseling or other support strategies to quit smoking. Do not take medicine to help you quit smoking unless told to do so by your health care  provider.  What things can I do to make it easier to quit?  Quitting smoking might feel overwhelming at first, but there is a lot that you can do to make it easier. Take these important actions:  · Reach out to your family and friends and ask that they support and encourage you during this time. Call telephone quitlines, reach out to support groups, or work with a counselor for support.  · Ask people who smoke to avoid smoking around you.  · Avoid places that trigger you to smoke, such as bars, parties, or smoke-break areas at work.  · Spend time around people who do not smoke.  · Lessen stress in your life, because stress can be a smoking trigger for some people. To lessen stress, try:  ¨ Exercising regularly.  ¨ Deep-breathing exercises.  ¨ Yoga.  ¨ Meditating.  ¨ Performing a body scan. This involves closing your eyes, scanning your body from head to toe, and noticing which parts of your body are particularly tense. Purposefully relax the muscles in those areas.  · Download or purchase mobile phone or tablet apps (applications) that can help you stick to your quit plan by providing reminders, tips, and encouragement. There are many free apps, such as QuitGuide from the CDC (Centers for Disease Control and Prevention). You can find other support for quitting smoking (smoking cessation) through smokefree.gov and other websites.  How will I feel when I quit smoking?  Within the first 24 hours of quitting smoking, you may start to feel some withdrawal symptoms. These symptoms are usually most noticeable 2-3 days after quitting, but they usually do not last beyond 2-3 weeks. Changes or symptoms that you might experience include:  · Mood swings.  · Restlessness, anxiety, or irritation.  · Difficulty concentrating.  · Dizziness.  · Strong cravings for sugary foods in addition to nicotine.  · Mild weight gain.  · Constipation.  · Nausea.  · Coughing or a sore throat.  · Changes in how your medicines work in your  body.  · A depressed mood.  · Difficulty sleeping (insomnia).  After the first 2-3 weeks of quitting, you may start to notice more positive results, such as:  · Improved sense of smell and taste.  · Decreased coughing and sore throat.  · Slower heart rate.  · Lower blood pressure.  · Clearer skin.  · The ability to breathe more easily.  · Fewer sick days.  Quitting smoking is very challenging for most people. Do not get discouraged if you are not successful the first time. Some people need to make many attempts to quit before they achieve long-term success. Do your best to stick to your quit plan, and talk with your health care provider if you have any questions or concerns.  This information is not intended to replace advice given to you by your health care provider. Make sure you discuss any questions you have with your health care provider.  Document Released: 12/12/2002 Document Revised: 08/15/2017 Document Reviewed: 05/03/2016  [employer] Interactive Patient Education © 2017 [employer]

## 2019-06-11 NOTE — PROGRESS NOTES
Chief Complaint   Patient presents with   • Oral Pain       HISTORY OF PRESENT ILLNESS: Patient is a 24 y.o. female who presents today because she has had pain in her lower jaw ever since losing part of her crown about 4 or 5 months ago when she was in prison however she has had more pain and swelling over the last several days the left lower jaw area.  She is currently in rehab so she has not been taking any medications whatsoever for her symptoms    Patient Active Problem List    Diagnosis Date Noted   • Acne 04/08/2015   • Narcotic addiction (HCC) 04/08/2015   • Anxiety 04/08/2015   • Lumbar back pain 07/22/2013       Allergies:Patient has no known allergies.    Current Outpatient Prescriptions Ordered in [facility]   Medication Sig Dispense Refill   • clindamycin (CLEOCIN) 300 MG Cap Take 1 Cap by mouth 3 times a day for 10 days. 30 Cap 0   • ibuprofen (MOTRIN) 800 MG Tab Take 1 Tab by mouth every 8 hours as needed. 30 Tab 0   • buPROPion (WELLBUTRIN) 100 MG Tab Take 1 Tab by mouth 2 times a day. (Patient not taking: Reported on 6/11/2019) 60 Tab 0   • citalopram (CELEXA) 20 MG Tab Take 1 Tab by mouth every day. (Patient not taking: Reported on 6/11/2019) 30 Tab 0   • cloNIDine (CATAPRES) 0.1 MG Tab Take 1 Tab by mouth 3 times a day as needed. (Patient not taking: Reported on 6/11/2019) 10 Tab 0   • mupirocin (BACTROBAN) 2 % Ointment Apply 1 Application to affected area(s) 3 times a day. 1 Tube 0   • cetirizine (ZYRTEC) 10 MG Tab Take 1 Tab by mouth every day. (Patient not taking: Reported on 6/11/2019) 30 Tab 0   • ketoconazole (NIZORAL) 2 % Cream Apply 1 Application to affected area(s) 2 times a day. 1 Tube 1   • doxycycline (VIBRAMYCIN) 100 MG TABS Take 1 Tab by mouth 2 times a day. 20 Tab 6   • Adapalene-Benzoyl Peroxide 0.1-2.5 % GEL Apply to face bid 1 Tube 3   • albuterol (VENTOLIN OR PROVENTIL) 108 (90 BASE) MCG/ACT AERS inhalation aerosol Inhale 2 Puffs by mouth every 6 hours as needed for Shortness of  Breath. 1 Inhaler 3   • fluticasone-salmeterol (ADVAIR) 100-50 MCG/DOSE AEPB Inhale 1 Puff by mouth every 12 hours. 1 Inhaler 3   • fluocinonide (LIDEX) 0.05 % CREA Apply to rash bid 1 Tube 3     No current Epic-ordered facility-administered medications on file.        Past Medical History:   Diagnosis Date   • Allergy, unspecified not elsewhere classified    • Anxiety    • Arrhythmia    • Back pain 2013   • Depression    • GERD (gastroesophageal reflux disease)    • Headache(784.0)    • Headache, classical migraine    • IBD (inflammatory bowel disease)    • Lumbar back pain 2013   • Rapid or irregular heartbeat     per pt double heartbeat when she was a child   • Substance abuse (HCC)     opiates   • Urinary tract infection, site not specified        Social History   Substance Use Topics   • Smoking status: Current Every Day Smoker     Packs/day: 1.00     Years: 5.00     Types: Cigarettes   • Smokeless tobacco: Never Used   • Alcohol use Yes      Comment: soc       Family Status   Relation Status   • Fa Alive   • MAunt Alive   • MGMo Alive   • Mo Alive   • Sis Alive   • MUnc Alive   • PUnc    • MGFa    • PGMo Alive   • PGFa    • Sis Alive   • MUnc Alive     Family History   Problem Relation Age of Onset   • Cancer Father         melanoma   • Arthritis Father         gout   • Diabetes Maternal Aunt    • Heart Disease Maternal Aunt    • Hypertension Maternal Aunt    • Hyperlipidemia Maternal Aunt    • Diabetes Maternal Grandmother    • Stroke Maternal Grandmother    • Heart Disease Maternal Grandmother    • Hypertension Maternal Grandmother    • Hyperlipidemia Maternal Grandmother    • Cancer Maternal Grandmother         breast   • Diabetes Mother    • Heart Disease Mother    • Hypertension Mother    • Hyperlipidemia Mother    • Lung Disease Mother         smoker   • Alcohol/Drug Mother         etoh   • Psychiatry Sister         anxiety   • Heart Disease Maternal Grandfather    •  "Hypertension Maternal Grandfather    • Hyperlipidemia Maternal Grandfather    • Arthritis Paternal Grandmother         osteoporosis   • Psychiatry Paternal Grandfather         sucide   • Psychiatry Sister         depression   • Lung Disease Maternal Uncle         Throat, lung   • Cancer Maternal Uncle         throat, lung   • Alcohol/Drug Maternal Uncle         etoh       ROS:  Review of Systems   Constitutional: Negative for fever, chills, weight loss and malaise/fatigue.   HENT: Negative for ear pain, nosebleeds, congestion, sore throat and neck pain.    Eyes: Negative for blurred vision.   Respiratory: Negative for cough, sputum production, shortness of breath and wheezing.    Cardiovascular: Negative for chest pain, palpitations, orthopnea and leg swelling.   Gastrointestinal: Negative for heartburn, nausea, vomiting and abdominal pain.   Genitourinary: Negative for dysuria, urgency and frequency.     Exam:  /70 (BP Location: Right arm, Patient Position: Sitting, BP Cuff Size: Adult)   Pulse 92   Temp 36.4 °C (97.5 °F) (Temporal)   Resp 16   Ht 1.727 m (5' 8\")   Wt 92.5 kg (204 lb)   SpO2 100%   General:  Well nourished, well developed female in NAD  Head:Normocephalic, atraumatic  Eyes: PERRLA, EOM within normal limits, no conjunctival injection, no scleral icterus, visual fields and acuity grossly intact.  Nose: Symmetrical without tenderness, no discharge.  Mouth: reasonable hygiene, no pharyngeal erythema exudates or tonsillar enlargement.  Tooth #19 is partially missing, there is surrounding gumline erythema and swelling without fluctuance in the buccal cavity  Neck: no masses, range of motion within normal limits, no tracheal deviation. No obvious thyroid enlargement.  Extremities: no clubbing, cyanosis, or edema.    Please note that this dictation was created using voice recognition software. I have made every reasonable attempt to correct obvious errors, but I expect that there are errors of " grammar and possibly content that I did not discover before finalizing the note.    Assessment/Plan:  1. Dental infection  clindamycin (CLEOCIN) 300 MG Cap   2. Pain, dental  ibuprofen (MOTRIN) 800 MG Tab   Follow-up with dentist    Followup with primary care in the next 7-10 days if not significantly improving, return to the urgent care or go to the emergency room sooner for any worsening of symptoms.

## 2019-06-18 ENCOUNTER — OFFICE VISIT (OUTPATIENT)
Dept: URGENT CARE | Facility: PHYSICIAN GROUP | Age: 24
End: 2019-06-18
Payer: MEDICAID

## 2019-06-18 VITALS
BODY MASS INDEX: 31.1 KG/M2 | HEIGHT: 69 IN | OXYGEN SATURATION: 93 % | HEART RATE: 65 BPM | DIASTOLIC BLOOD PRESSURE: 70 MMHG | TEMPERATURE: 99.1 F | SYSTOLIC BLOOD PRESSURE: 104 MMHG | RESPIRATION RATE: 18 BRPM | WEIGHT: 210 LBS

## 2019-06-18 DIAGNOSIS — H10.13 ALLERGIC CONJUNCTIVITIS OF BOTH EYES: ICD-10-CM

## 2019-06-18 DIAGNOSIS — E66.9 OBESITY (BMI 30-39.9): ICD-10-CM

## 2019-06-18 DIAGNOSIS — L70.9 ACNE, UNSPECIFIED ACNE TYPE: ICD-10-CM

## 2019-06-18 PROCEDURE — 99214 OFFICE O/P EST MOD 30 MIN: CPT | Performed by: PHYSICIAN ASSISTANT

## 2019-06-18 RX ORDER — KETOTIFEN FUMARATE 0.25 MG/ML
1 SOLUTION/ DROPS OPHTHALMIC 2 TIMES DAILY
Qty: 10 ML | Refills: 0 | Status: SHIPPED | OUTPATIENT
Start: 2019-06-18

## 2019-06-18 RX ORDER — CLINDAMYCIN PHOSPHATE 10 MG/G
1 GEL TOPICAL 2 TIMES DAILY
Qty: 1 TUBE | Refills: 0 | Status: SHIPPED | OUTPATIENT
Start: 2019-06-18

## 2019-06-18 NOTE — PROGRESS NOTES
Chief Complaint   Patient presents with   • Conjunctivitis     bilateral eye redness, goupy eyes x1 day    • Acne     pt needs topical gel for ance        HISTORY OF PRESENT ILLNESS: Patient is a 24 y.o. female who presents today because She has 2 complaints.    1.  She has a 1 day history of right eye redness, and a little bit of watery sensation.  She has not been putting any drops in her eyes.  Denies any visual disturbances or changes.    2.  She has a history of acne, has had Clindagel in the past that has worked well for her and she would like a refill on it.    Currently she has not treatment for substance abuse and needs prescriptions for these medications in order to take them.    Patient Active Problem List    Diagnosis Date Noted   • Obesity (BMI 30-39.9) 06/18/2019   • Acne 04/08/2015   • Narcotic addiction (HCC) 04/08/2015   • Anxiety 04/08/2015   • Lumbar back pain 07/22/2013       Allergies:Patient has no known allergies.    Current Outpatient Prescriptions Ordered in [facility]   Medication Sig Dispense Refill   • ketotifen (ZADITOR) 0.025 % ophthalmic solution Place 1 Drop in both eyes 2 times a day. 10 mL 0   • clindamycin (CLEOCIN T) 1 % Gel Apply 1 Application to affected area(s) 2 times a day. 1 Tube 0   • clindamycin (CLEOCIN) 300 MG Cap Take 1 Cap by mouth 3 times a day for 10 days. 30 Cap 0   • ibuprofen (MOTRIN) 800 MG Tab Take 1 Tab by mouth every 8 hours as needed. 30 Tab 0   • buPROPion (WELLBUTRIN) 100 MG Tab Take 1 Tab by mouth 2 times a day. (Patient not taking: Reported on 6/11/2019) 60 Tab 0   • citalopram (CELEXA) 20 MG Tab Take 1 Tab by mouth every day. (Patient not taking: Reported on 6/11/2019) 30 Tab 0   • cloNIDine (CATAPRES) 0.1 MG Tab Take 1 Tab by mouth 3 times a day as needed. (Patient not taking: Reported on 6/11/2019) 10 Tab 0   • mupirocin (BACTROBAN) 2 % Ointment Apply 1 Application to affected area(s) 3 times a day. (Patient not taking: Reported on 6/18/2019) 1 Tube 0   •  cetirizine (ZYRTEC) 10 MG Tab Take 1 Tab by mouth every day. (Patient not taking: Reported on 2019) 30 Tab 0   • ketoconazole (NIZORAL) 2 % Cream Apply 1 Application to affected area(s) 2 times a day. (Patient not taking: Reported on 2019) 1 Tube 1   • doxycycline (VIBRAMYCIN) 100 MG TABS Take 1 Tab by mouth 2 times a day. (Patient not taking: Reported on 2019) 20 Tab 6   • Adapalene-Benzoyl Peroxide 0.1-2.5 % GEL Apply to face bid (Patient not taking: Reported on 2019) 1 Tube 3   • albuterol (VENTOLIN OR PROVENTIL) 108 (90 BASE) MCG/ACT AERS inhalation aerosol Inhale 2 Puffs by mouth every 6 hours as needed for Shortness of Breath. (Patient not taking: Reported on 2019) 1 Inhaler 3   • fluticasone-salmeterol (ADVAIR) 100-50 MCG/DOSE AEPB Inhale 1 Puff by mouth every 12 hours. 1 Inhaler 3   • fluocinonide (LIDEX) 0.05 % CREA Apply to rash bid (Patient not taking: Reported on 2019) 1 Tube 3     No current Epic-ordered facility-administered medications on file.        Past Medical History:   Diagnosis Date   • Allergy, unspecified not elsewhere classified    • Anxiety    • Arrhythmia    • Back pain 2013   • Depression    • GERD (gastroesophageal reflux disease)    • Headache(784.0)    • Headache, classical migraine    • IBD (inflammatory bowel disease)    • Lumbar back pain 2013   • Rapid or irregular heartbeat     per pt double heartbeat when she was a child   • Substance abuse (HCC)     opiates   • Urinary tract infection, site not specified        Social History   Substance Use Topics   • Smoking status: Former Smoker     Packs/day: 1.00     Years: 5.00     Types: Cigarettes     Quit date: 2019   • Smokeless tobacco: Never Used   • Alcohol use Yes      Comment: soc       Family Status   Relation Status   • Fa Alive   • MAunt Alive   • MGMo Alive   • Mo Alive   • Sis Alive   • MUnc Alive   • PUnc    • MGFa    • PGMo Alive   • PGFa    • Sis Alive  "  • MUnc Alive     Family History   Problem Relation Age of Onset   • Cancer Father         melanoma   • Arthritis Father         gout   • Diabetes Maternal Aunt    • Heart Disease Maternal Aunt    • Hypertension Maternal Aunt    • Hyperlipidemia Maternal Aunt    • Diabetes Maternal Grandmother    • Stroke Maternal Grandmother    • Heart Disease Maternal Grandmother    • Hypertension Maternal Grandmother    • Hyperlipidemia Maternal Grandmother    • Cancer Maternal Grandmother         breast   • Diabetes Mother    • Heart Disease Mother    • Hypertension Mother    • Hyperlipidemia Mother    • Lung Disease Mother         smoker   • Alcohol/Drug Mother         etoh   • Psychiatry Sister         anxiety   • Heart Disease Maternal Grandfather    • Hypertension Maternal Grandfather    • Hyperlipidemia Maternal Grandfather    • Arthritis Paternal Grandmother         osteoporosis   • Psychiatry Paternal Grandfather         sucide   • Psychiatry Sister         depression   • Lung Disease Maternal Uncle         Throat, lung   • Cancer Maternal Uncle         throat, lung   • Alcohol/Drug Maternal Uncle         etoh       ROS:  Review of Systems   Constitutional: Negative for fever, chills, weight loss and malaise/fatigue.   HENT: Negative for ear pain, nosebleeds, congestion, sore throat and neck pain.    Eyes: Negative for blurred vision.  Positive for eye complaints as in HPI  Respiratory: Negative for cough, sputum production, shortness of breath and wheezing.    Cardiovascular: Negative for chest pain, palpitations, orthopnea and leg swelling.   Gastrointestinal: Negative for heartburn, nausea, vomiting and abdominal pain.   Genitourinary: Negative for dysuria, urgency and frequency.     Exam:  /70 (BP Location: Right arm, Patient Position: Sitting, BP Cuff Size: Small adult)   Pulse 65   Temp 37.3 °C (99.1 °F) (Temporal)   Resp 18   Ht 1.753 m (5' 9\")   Wt 95.3 kg (210 lb)   SpO2 93%   General:  Well " nourished, well developed female in NAD  Head:Normocephalic, atraumatic  Eyes: PERRLA, EOM within normal limits, mild bilateral conjunctival injection, no scleral icterus, visual fields and acuity grossly intact.  Nose: Symmetrical without tenderness, no discharge.  Mouth: reasonable hygiene, no erythema exudates or tonsillar enlargement.  Neck: no masses, range of motion within normal limits, no tracheal deviation. No obvious thyroid enlargement.  Extremities: no clubbing, cyanosis, or edema.  Skin: Mild to moderate acne on her face.    Please note that this dictation was created using voice recognition software. I have made every reasonable attempt to correct obvious errors, but I expect that there are errors of grammar and possibly content that I did not discover before finalizing the note.    Assessment/Plan:  1. Acne, unspecified acne type  clindamycin (CLEOCIN T) 1 % Gel   2. Allergic conjunctivitis of both eyes  ketotifen (ZADITOR) 0.025 % ophthalmic solution   3. Obesity (BMI 30-39.9)  Patient identified as having weight management issue.  Appropriate orders and counseling given.       Followup with primary care in the next 7-10 days if not significantly improving, return to the urgent care or go to the emergency room sooner for any worsening of symptoms.

## 2019-10-08 ENCOUNTER — NON-PROVIDER VISIT (OUTPATIENT)
Dept: URGENT CARE | Facility: CLINIC | Age: 24
End: 2019-10-08

## 2019-10-08 DIAGNOSIS — Z02.1 PRE-EMPLOYMENT DRUG SCREENING: ICD-10-CM

## 2019-10-08 LAB
AMP AMPHETAMINE: NORMAL
BAR BARBITURATES: NORMAL
BZO BENZODIAZEPINES: NORMAL
COC COCAINE: NORMAL
INT CON NEG: NORMAL
INT CON POS: NORMAL
MDMA ECSTASY: NORMAL
MET METHAMPHETAMINES: NORMAL
MTD METHADONE: NORMAL
OPI OPIATES: NORMAL
OXY OXYCODONE: NORMAL
PCP PHENCYCLIDINE: NORMAL
POC URINE DRUG SCREEN OCDRS: NORMAL
THC: NORMAL

## 2019-10-08 PROCEDURE — 80305 DRUG TEST PRSMV DIR OPT OBS: CPT | Performed by: FAMILY MEDICINE

## 2021-09-22 ENCOUNTER — HOSPITAL ENCOUNTER (EMERGENCY)
Facility: MEDICAL CENTER | Age: 26
End: 2021-09-22
Attending: EMERGENCY MEDICINE

## 2021-09-22 VITALS
WEIGHT: 179.23 LBS | HEIGHT: 69 IN | SYSTOLIC BLOOD PRESSURE: 120 MMHG | DIASTOLIC BLOOD PRESSURE: 80 MMHG | TEMPERATURE: 97.9 F | HEART RATE: 85 BPM | RESPIRATION RATE: 18 BRPM | BODY MASS INDEX: 26.55 KG/M2 | OXYGEN SATURATION: 96 %

## 2021-09-22 DIAGNOSIS — L03.116 LEFT LEG CELLULITIS: ICD-10-CM

## 2021-09-22 PROCEDURE — 99282 EMERGENCY DEPT VISIT SF MDM: CPT

## 2021-09-22 RX ORDER — DOXYCYCLINE 100 MG/1
100 CAPSULE ORAL 2 TIMES DAILY
Qty: 20 CAPSULE | Refills: 0 | Status: SHIPPED | OUTPATIENT
Start: 2021-09-22 | End: 2021-09-22 | Stop reason: SDUPTHER

## 2021-09-22 RX ORDER — DOXYCYCLINE 100 MG/1
100 CAPSULE ORAL 2 TIMES DAILY
Qty: 20 CAPSULE | Refills: 0 | Status: SHIPPED | OUTPATIENT
Start: 2021-09-22 | End: 2021-10-02

## 2021-09-23 NOTE — ED TRIAGE NOTES
Tia Marie Cryer  26 y.o.  female  Chief Complaint   Patient presents with   • Leg Pain     c/o left lower leg possible cellulitis x 1 week. states swelling and redness worse today. no fever.

## 2021-09-23 NOTE — ED PROVIDER NOTES
ED Provider Note    CHIEF COMPLAINT  Chief Complaint   Patient presents with   • Leg Pain     c/o left lower leg possible cellulitis x 1 week. states swelling and redness worse today. no fever.       HPI  Tia Marie Cryer is a 26 y.o. female who presents the emergency room complaining of one week of left leg redness and discomfort. Past medical history as document below. 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 States that she is allegedly been quit of recent. Now with wound to the calf similar to prior cellulitis. She did squeeze the lesion which resulted in a moderate amount of discharge last night. Now here for antibiotics.    REVIEW OF SYSTEMS  See HPI for further details. All other systems are negative.     PAST MEDICAL HISTORY   has a past medical history of Allergy, unspecified not elsewhere classified, Anxiety, Arrhythmia, Back pain (2013), Depression, GERD (gastroesophageal reflux disease), Headache(784.0), Headache, classical migraine, IBD (inflammatory bowel disease), Lumbar back pain (2013), Rapid or irregular heartbeat, Substance abuse ([facility]), and Urinary tract infection, site not specified.    SOCIAL HISTORY  Social History     Tobacco Use   • Smoking status: Former Smoker     Packs/day: 1.00     Years: 5.00     Pack years: 5.00     Types: Cigarettes     Quit date: 2019     Years since quittin.4   • Smokeless tobacco: Never Used   Vaping Use   • Vaping Use: Never used   Substance and Sexual Activity   • Alcohol use: Yes     Comment: soc   • Drug use: No     Types: Marijuana   • Sexual activity: Yes     Partners: Male     Comment: nothing used-want pregnancy       SURGICAL HISTORY  patient denies any surgical history    CURRENT MEDICATIONS  Home Medications     Reviewed by John Castro R.N. (Registered Nurse) on 21 at 2205  Med List Status: Partial   Medication Last Dose Status   Adapalene-Benzoyl Peroxide 0.1-2.5 % GEL  Active   albuterol (VENTOLIN OR PROVENTIL) 108 (90  "BASE) MCG/ACT AERS inhalation aerosol  Active   buPROPion (WELLBUTRIN) 100 MG Tab  Active   cetirizine (ZYRTEC) 10 MG Tab  Active   citalopram (CELEXA) 20 MG Tab  Active   clindamycin (CLEOCIN T) 1 % Gel  Active   cloNIDine (CATAPRES) 0.1 MG Tab  Active   doxycycline (VIBRAMYCIN) 100 MG TABS  Active   fluocinonide (LIDEX) 0.05 % CREA  Active   fluticasone-salmeterol (ADVAIR) 100-50 MCG/DOSE AEPB  Active   ibuprofen (MOTRIN) 800 MG Tab  Active   ketoconazole (NIZORAL) 2 % Cream  Active   ketotifen (ZADITOR) 0.025 % ophthalmic solution  Active   mupirocin (BACTROBAN) 2 % Ointment  Active                ALLERGIES  No Known Allergies    PHYSICAL EXAM  VITAL SIGNS: /80   Pulse 94   Temp 36.6 °C (97.9 °F) (Temporal)   Resp 17   Ht 1.753 m (5' 9\")   Wt 81.3 kg (179 lb 3.7 oz)   SpO2 92%   BMI 26.47 kg/m²  @MODESTA[[id number]::@  Pulse ox interpretation: I interpret this pulse ox as normal.  Constitutional: Alert in no apparent distress.  HENT: Normocephalic, Atraumatic  Lungs: no respiratory distress  Skin: Warm, Dry  left leg: 20 x 10 cm area of erythema to the medial calf with superior medial dermal opening and slight serious drainage. Areas largely integrated. No area fluctuates. Minimally tender. No increased warmth.  Neurologic: Alert, Grossly non-focal.   Psychiatric: Affect normal, Judgment normal, Mood normal, Appears appropriate and not intoxicated.           COURSE & MEDICAL DECISION MAKING  Pertinent Labs & Imaging studies reviewed. (See chart for details)  26-year-old female presented emergency department with leg infection. History as above. Exam consistent with cellulitis. Also small abscess which is now draining. This point I do not believe the lesion is amenable to incision and drainage. Patient will be empirically started on antibiotics. Understanding return precautions and outpatient follow-up as referred.      The patient will return for worsening symptoms and is stable at the time of discharge. The " patient verbalizes understanding and will comply.    FINAL IMPRESSION  1. Left leg cellulitis               Electronically signed by: Chaka Jimenez M.D., 9/22/2021 11:44 PM

## 2021-09-23 NOTE — ED NOTES
Patient educated on discharge instructions, prescriptions, and home care. Patient verbalized understanding. Patient ambulated to [facility].

## 2021-09-23 NOTE — PROGRESS NOTES
"Patient ambulated to room with steady gait. Patient reports using IV drugs in leg with resulting redness and pustule formation in which she \"popped\".  Site is red and warm to touch.   "

## 2022-01-28 ENCOUNTER — HOSPITAL ENCOUNTER (EMERGENCY)
Facility: MEDICAL CENTER | Age: 27
End: 2022-01-28
Attending: EMERGENCY MEDICINE
Payer: MEDICAID

## 2022-01-28 VITALS
TEMPERATURE: 97.5 F | OXYGEN SATURATION: 100 % | SYSTOLIC BLOOD PRESSURE: 125 MMHG | RESPIRATION RATE: 18 BRPM | WEIGHT: 181 LBS | BODY MASS INDEX: 26.73 KG/M2 | DIASTOLIC BLOOD PRESSURE: 80 MMHG | HEART RATE: 95 BPM

## 2022-01-28 DIAGNOSIS — Z76.0 MEDICATION REFILL: ICD-10-CM

## 2022-01-28 DIAGNOSIS — F41.9 ANXIETY: ICD-10-CM

## 2022-01-28 PROCEDURE — 99283 EMERGENCY DEPT VISIT LOW MDM: CPT

## 2022-01-28 PROCEDURE — A9270 NON-COVERED ITEM OR SERVICE: HCPCS | Performed by: EMERGENCY MEDICINE

## 2022-01-28 PROCEDURE — 700102 HCHG RX REV CODE 250 W/ 637 OVERRIDE(OP): Performed by: EMERGENCY MEDICINE

## 2022-01-28 RX ORDER — ALPRAZOLAM 1 MG/1
1 TABLET ORAL 3 TIMES DAILY PRN
Qty: 10 TABLET | Refills: 0 | Status: SHIPPED | OUTPATIENT
Start: 2022-01-28 | End: 2022-01-31

## 2022-01-28 RX ORDER — ALPRAZOLAM 0.25 MG/1
1 TABLET ORAL ONCE
Status: COMPLETED | OUTPATIENT
Start: 2022-01-28 | End: 2022-01-28

## 2022-01-28 RX ORDER — ALPRAZOLAM 1 MG/1
1 TABLET ORAL 3 TIMES DAILY PRN
Qty: 10 TABLET | Refills: 0 | Status: SHIPPED | OUTPATIENT
Start: 2022-01-28 | End: 2022-01-28 | Stop reason: SDUPTHER

## 2022-01-28 RX ADMIN — ALPRAZOLAM 1 MG: 0.25 TABLET ORAL at 15:57

## 2022-01-28 NOTE — ED TRIAGE NOTES
Alprazolam 1mg prn 3 times a day and wellbutrin 150mg 1 tablet a day.  Has been out of the medication for a few days.  Patient was told we likely would not fill the alprazolam.

## 2022-01-28 NOTE — ED PROVIDER NOTES
"ED Provider Note    Scribed for Cisco Fitzpatrick M.D. by Angel Richards. 2022  3:35 PM    Primary care provider: Pcp Pt States None  Means of arrival: Walk in  History obtained from: Patient  History limited by: None    CHIEF COMPLAINT  Chief Complaint   Patient presents with    Medication Refill       HPI  Tia Marie Cryer is a 26 y.o. female who presents to the Emergency Department for a medication refill. The patient reports recently moving from [place] where she had previously received a prescription from her previous PCP, but has since run out. She has refills for some of her medication, but is specifically seeking to refill her Xanax prescription. Patient admits to taking Wellbutrin (100 mg, 1x daily), and Xanax for her anxiety as needed. She admits that she has been taking 2-3 Xanax daily, because she \"had a recent seizure\" and to mitigate her anxiety. Patient denies any fever or chills.     REVIEW OF SYSTEMS  Pertinent positives include medication refill and increased anxiety. Pertinent negatives include no fevers or chills.     PAST MEDICAL HISTORY   has a past medical history of Allergy, unspecified not elsewhere classified, Anxiety, Arrhythmia, Back pain (2013), Depression, GERD (gastroesophageal reflux disease), Headache(784.0), Headache, classical migraine, IBD (inflammatory bowel disease), Lumbar back pain (2013), Rapid or irregular heartbeat, Substance abuse (HCC), and Urinary tract infection, site not specified.    SURGICAL HISTORY  patient denies any surgical history    SOCIAL HISTORY  Social History     Tobacco Use    Smoking status: Former Smoker     Packs/day: 1.00     Years: 5.00     Pack years: 5.00     Types: Cigarettes     Quit date: 2019     Years since quittin.7    Smokeless tobacco: Never Used   Vaping Use    Vaping Use: Never used   Substance Use Topics    Alcohol use: Yes     Comment: soc    Drug use: No     Types: Marijuana      Social History     Substance and " Sexual Activity   Drug Use No    Types: Marijuana       FAMILY HISTORY  Family History   Problem Relation Age of Onset    Cancer Father         melanoma    Arthritis Father         gout    Diabetes Maternal Aunt     Heart Disease Maternal Aunt     Hypertension Maternal Aunt     Hyperlipidemia Maternal Aunt     Diabetes Maternal Grandmother     Stroke Maternal Grandmother     Heart Disease Maternal Grandmother     Hypertension Maternal Grandmother     Hyperlipidemia Maternal Grandmother     Cancer Maternal Grandmother         breast    Diabetes Mother     Heart Disease Mother     Hypertension Mother     Hyperlipidemia Mother     Lung Disease Mother         smoker    Alcohol/Drug Mother         etoh    Psychiatric Illness Sister         anxiety    Heart Disease Maternal Grandfather     Hypertension Maternal Grandfather     Hyperlipidemia Maternal Grandfather     Arthritis Paternal Grandmother         osteoporosis    Psychiatric Illness Paternal Grandfather         sucide    Psychiatric Illness Sister         depression    Lung Disease Maternal Uncle         Throat, lung    Cancer Maternal Uncle         throat, lung    Alcohol/Drug Maternal Uncle         etoh       CURRENT MEDICATIONS  Home Medications       Reviewed by Kaitlin Garland R.N. (Registered Nurse) on 01/28/22 at 1510  Med List Status: <None>     Medication Last Dose Status   Adapalene-Benzoyl Peroxide 0.1-2.5 % GEL  Active   albuterol (VENTOLIN OR PROVENTIL) 108 (90 BASE) MCG/ACT AERS inhalation aerosol  Active   buPROPion (WELLBUTRIN) 100 MG Tab  Active   cetirizine (ZYRTEC) 10 MG Tab  Active   citalopram (CELEXA) 20 MG Tab  Active   clindamycin (CLEOCIN T) 1 % Gel  Active   cloNIDine (CATAPRES) 0.1 MG Tab  Active   doxycycline (VIBRAMYCIN) 100 MG TABS  Active   fluocinonide (LIDEX) 0.05 % CREA  Active   fluticasone-salmeterol (ADVAIR) 100-50 MCG/DOSE AEPB  Active   ibuprofen (MOTRIN) 800 MG Tab  Active   ketoconazole (NIZORAL) 2 % Cream  Active    ketotifen (ZADITOR) 0.025 % ophthalmic solution  Active   mupirocin (BACTROBAN) 2 % Ointment  Active                    ALLERGIES  No Known Allergies    PHYSICAL EXAM  VITAL SIGNS: /80   Pulse 96   Temp 36.4 °C (97.6 °F) (Temporal)   Resp 18   Wt 82.1 kg (181 lb)   SpO2 99%   BMI 26.73 kg/m²     Constitutional: Well developed, Well nourished, mild distress, Non-toxic appearance.   HENT: Normocephalic, Atraumatic.  Oropharynx moist.   Eyes: PERRL, EOMI, Conjunctiva normal, No discharge.   Neck: no anterior cervical lymphadenopathy  CV: Good pulses  Thorax & Lungs: No respiratory distress.   Skin: Warm, Dry, No erythema, No rash.    Musculoskeletal: No major deformities noted.   Neurologic: Awake, alert. Moves all extremities spontaneously.  Psychiatric: Affect normal, Mood normal. Slightly anxious    COURSE & MEDICAL DECISION MAKING  Pertinent Labs & Imaging studies reviewed. (See chart for details)    3:35 PM - Patient seen and examined at bedside. I informed her about some resources to refill her prescription sooner than her appointment next month . Patient will be treated with Xanax 1 mg. Discussed plan for discharge; I advised the patient to obtain a PCP as soon as possible, and to return to the [facility] ED with any new or worsening symptoms, including seizures or fever. Patient was given the opportunity for questions. I addressed all questions or concerns at this time and they verbalize agreement to the plan of care.     Decision Making:  Patient is coming in for medication refill, want Xanax, discussed with her due to the narcotic policy that we are unable to provide a long-term controlled substances will give the patient a dose here and a prescription for 10 of them, have the patient return with worsening symptoms.  Gave the patient resources as for outpatient follow-up.      I reviewed prescription monitoring program for patient's narcotic use before prescribing a scheduled drug.The patient will  not drink alcohol nor drive with prescribed medications. The patient will return for new or worsening symptoms and is stable at the time of discharge.    I have queried the patient in the prescription monitoring program, I do not see any evidence of prescription abuse.    DISPOSITION:  Patient will be discharged home in stable condition.    FOLLOW UP:  [facility] Emergency Dept  [address] [facility] [id number] [phone]    If symptoms worsen      OUTPATIENT MEDICATIONS:  Discharge Medication List as of 1/28/2022  3:53 PM            FINAL IMPRESSION  1. Medication refill    2. Anxiety          Angel GUNDERSON (Scribe), am scribing for, and in the presence of, Cisco Fitzpatrick M.D..    Electronically signed by: Angel Richards (Scribe), 1/28/2022    ICisco M.D. personally performed the services described in this documentation, as scribed by Angel Richards in my presence, and it is both accurate and complete. E    The note accurately reflects work and decisions made by me.  Cisco Fitzpatrick M.D.  1/28/2022  10:21 PM

## 2022-01-29 NOTE — DISCHARGE PLANNING
ALERT team BH note:  Per [facility] ERP Dr. Fitzpatrick's request, writer RN called pt and reviewed community MH resources with pt, with written information given, including [facility], [facility], [facility], and [facility] transportation included in pt's insurance plan; writer RN spoke to pt on the phone as pt left prior to meeting wit pt in person; pt verbalized understanding    Medicaid HPN insurance plan

## 2022-01-29 NOTE — ED NOTES
Patient provided with discharge instructions. Patient verbalized understanding. Patient assisted out of ed with steady gait.

## 2023-02-14 ENCOUNTER — APPOINTMENT (OUTPATIENT)
Dept: RADIOLOGY | Facility: MEDICAL CENTER | Age: 28
End: 2023-02-14
Attending: EMERGENCY MEDICINE
Payer: MEDICAID

## 2023-02-14 ENCOUNTER — HOSPITAL ENCOUNTER (EMERGENCY)
Facility: MEDICAL CENTER | Age: 28
End: 2023-02-14
Attending: EMERGENCY MEDICINE
Payer: MEDICAID

## 2023-02-14 VITALS
RESPIRATION RATE: 16 BRPM | HEART RATE: 86 BPM | DIASTOLIC BLOOD PRESSURE: 88 MMHG | BODY MASS INDEX: 27.31 KG/M2 | WEIGHT: 184.97 LBS | TEMPERATURE: 98.2 F | OXYGEN SATURATION: 98 % | SYSTOLIC BLOOD PRESSURE: 139 MMHG

## 2023-02-14 DIAGNOSIS — O20.0 THREATENED MISCARRIAGE: ICD-10-CM

## 2023-02-14 DIAGNOSIS — N93.9 VAGINAL BLEEDING: ICD-10-CM

## 2023-02-14 DIAGNOSIS — R30.0 DYSURIA: ICD-10-CM

## 2023-02-14 LAB
ACTION RH IMMUNE GLOB 8505RHG: NORMAL
ALBUMIN SERPL BCP-MCNC: 4.6 G/DL (ref 3.2–4.9)
ALBUMIN/GLOB SERPL: 1.8 G/DL
ALP SERPL-CCNC: 71 U/L (ref 30–99)
ALT SERPL-CCNC: 12 U/L (ref 2–50)
ANION GAP SERPL CALC-SCNC: 12 MMOL/L (ref 7–16)
AST SERPL-CCNC: 18 U/L (ref 12–45)
B-HCG SERPL-ACNC: 1303 MIU/ML (ref 0–5)
BASOPHILS # BLD AUTO: 0.2 % (ref 0–1.8)
BASOPHILS # BLD: 0.02 K/UL (ref 0–0.12)
BILIRUB SERPL-MCNC: 0.3 MG/DL (ref 0.1–1.5)
BUN SERPL-MCNC: 13 MG/DL (ref 8–22)
CALCIUM ALBUM COR SERPL-MCNC: 9.2 MG/DL (ref 8.5–10.5)
CALCIUM SERPL-MCNC: 9.7 MG/DL (ref 8.5–10.5)
CHLORIDE SERPL-SCNC: 101 MMOL/L (ref 96–112)
CO2 SERPL-SCNC: 22 MMOL/L (ref 20–33)
CREAT SERPL-MCNC: 0.61 MG/DL (ref 0.5–1.4)
EOSINOPHIL # BLD AUTO: 0.09 K/UL (ref 0–0.51)
EOSINOPHIL NFR BLD: 1 % (ref 0–6.9)
ERYTHROCYTE [DISTWIDTH] IN BLOOD BY AUTOMATED COUNT: 39.7 FL (ref 35.9–50)
GFR SERPLBLD CREATININE-BSD FMLA CKD-EPI: 125 ML/MIN/1.73 M 2
GLOBULIN SER CALC-MCNC: 2.6 G/DL (ref 1.9–3.5)
GLUCOSE SERPL-MCNC: 90 MG/DL (ref 65–99)
HCT VFR BLD AUTO: 36.5 % (ref 37–47)
HGB BLD-MCNC: 12.1 G/DL (ref 12–16)
IMM GRANULOCYTES # BLD AUTO: 0.03 K/UL (ref 0–0.11)
IMM GRANULOCYTES NFR BLD AUTO: 0.3 % (ref 0–0.9)
LIPASE SERPL-CCNC: 18 U/L (ref 11–82)
LYMPHOCYTES # BLD AUTO: 2.37 K/UL (ref 1–4.8)
LYMPHOCYTES NFR BLD: 26.1 % (ref 22–41)
MCH RBC QN AUTO: 29.2 PG (ref 27–33)
MCHC RBC AUTO-ENTMCNC: 33.2 G/DL (ref 33.6–35)
MCV RBC AUTO: 88.2 FL (ref 81.4–97.8)
MONOCYTES # BLD AUTO: 0.48 K/UL (ref 0–0.85)
MONOCYTES NFR BLD AUTO: 5.3 % (ref 0–13.4)
NEUTROPHILS # BLD AUTO: 6.08 K/UL (ref 2–7.15)
NEUTROPHILS NFR BLD: 67.1 % (ref 44–72)
NRBC # BLD AUTO: 0 K/UL
NRBC BLD-RTO: 0 /100 WBC
NUMBER OF RH DOSES IND 8505RD: 1
PLATELET # BLD AUTO: 217 K/UL (ref 164–446)
PMV BLD AUTO: 10.4 FL (ref 9–12.9)
POTASSIUM SERPL-SCNC: 3.8 MMOL/L (ref 3.6–5.5)
PROT SERPL-MCNC: 7.2 G/DL (ref 6–8.2)
RBC # BLD AUTO: 4.14 M/UL (ref 4.2–5.4)
RH BLD: NORMAL
SODIUM SERPL-SCNC: 135 MMOL/L (ref 135–145)
WBC # BLD AUTO: 9.1 K/UL (ref 4.8–10.8)

## 2023-02-14 PROCEDURE — 80053 COMPREHEN METABOLIC PANEL: CPT

## 2023-02-14 PROCEDURE — 86901 BLOOD TYPING SEROLOGIC RH(D): CPT

## 2023-02-14 PROCEDURE — 76801 OB US < 14 WKS SINGLE FETUS: CPT

## 2023-02-14 PROCEDURE — 85025 COMPLETE CBC W/AUTO DIFF WBC: CPT

## 2023-02-14 PROCEDURE — 83690 ASSAY OF LIPASE: CPT

## 2023-02-14 PROCEDURE — 99284 EMERGENCY DEPT VISIT MOD MDM: CPT

## 2023-02-14 PROCEDURE — 84702 CHORIONIC GONADOTROPIN TEST: CPT

## 2023-02-14 PROCEDURE — 36415 COLL VENOUS BLD VENIPUNCTURE: CPT

## 2023-02-14 RX ORDER — CEFDINIR 300 MG/1
300 CAPSULE ORAL 2 TIMES DAILY
Qty: 14 CAPSULE | Refills: 0 | Status: ACTIVE | OUTPATIENT
Start: 2023-02-14 | End: 2023-02-21

## 2023-02-14 NOTE — ED NOTES
Dr. Urban at bedside to update pt and significant other on POC.  After multiple attempts of re-education, pt and significant other agreeable to plan.  Phlebotomy notified of need for second lab draw.  Phlebotomy at bedside for lab draw.

## 2023-02-14 NOTE — ED NOTES
Pt educated that urine sample needed. Per pt, she does not have to go at this time. Pt educated to call when she feels that she can go so sample can be collected. Per verbalized understanding. Call light within reach.

## 2023-02-14 NOTE — ED NOTES
Pt refusing to give urine specimen.  Alerted ERP that she would like to leave.  ERP requests pt wait for result of blood typing to determine if she will require a rhogam dosing to prevent complications with future pregnancies.  Pt very upset.      Information about blood typing and pregnancy provided ot pt.
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
## 2023-02-14 NOTE — ED PROVIDER NOTES
CHIEF COMPLAINT:  Quynh Baird 12/02/2013   • Breast Cancer Screening  04/07/2017   • Medicare Wellness 65+  05/17/2017   • Influenza Vaccine (1) 09/01/Dec 29, 2020 Not Due Dec 29, 2010    Osteoporosis Screening  Completed Dec 28, 2009    Immunization-Zoster Dec 2, 2008 Overdue     Immunization - Pneumococcal Dec 2, 2013 Overdue     Immunization - TDAP Pregnancy  Hidden     Medicare Wellness Visit May 17, 2017 Overdue May 17, 2016    IMMUNIZATION - DTaP/Tdap/Td Aug 28, 2019 Not Due Aug 28, 2009    Immunization-Influenza Sep 1, 2017 Not Due           Patient is due for topics as listed above, she wishes to discuss with provider .           ED Provider Note    CHIEF COMPLAINT  Chief Complaint   Patient presents with    Vaginal Bleeding     PT reports bleeding x 1 day, had large episode of bleeding, passing clots and tissue in our BR 5 minutes ago.  Pt reports LMP 12/15, no U/S done.    Alleged Assault     Pt reports she was assaulted by another woman yesterday at 0500.  SHe says the woman spit in her face and then grabbed at her.  No LOC, No head strike.         EXTERNAL RECORDS REVIEWED  Previous ED visit.    HPI/ROS  LIMITATION TO HISTORY   Select: : None  OUTSIDE HISTORIAN(S):  Partner who is at her bedside.  Provides additional history.    Tia Marie Cryer is a 27 y.o. female who presents emergency department with 2 separate complaints.    First complaint is possible miscarriage.  The patient is a G1, P0 with a positive home pregnancy test and LMP in mid December.  She did not know she was pregnant.  She states that she was assaulted a couple days ago which primarily consisted of her being spat upon someone in her face followed by her running and pushing someone but no direct blow to the chest abdomen or head.  She felt some spotting after that and had some spotting and bleeding since that time.  She denies heavy bleeding and cramping over the weekend since that time and then she had worsening bleeding cramping and may be the passage of some tissue today.  She had severe cramping and bleeding earlier seems to be tapering off in the midline of her lower abdomen.    PAST MEDICAL HISTORY   has a past medical history of Allergy, unspecified not elsewhere classified, Anxiety, Arrhythmia, Back pain (7/22/2013), Depression, GERD (gastroesophageal reflux disease), Headache(784.0), Headache, classical migraine, IBD (inflammatory bowel disease), Lumbar back pain (7/22/2013), Rapid or irregular heartbeat, Substance abuse (HCC), and Urinary tract infection, site not specified.    SURGICAL HISTORY  patient denies any surgical history    FAMILY HISTORY  Family  History   Problem Relation Age of Onset    Cancer Father         melanoma    Arthritis Father         gout    Diabetes Maternal Aunt     Heart Disease Maternal Aunt     Hypertension Maternal Aunt     Hyperlipidemia Maternal Aunt     Diabetes Maternal Grandmother     Stroke Maternal Grandmother     Heart Disease Maternal Grandmother     Hypertension Maternal Grandmother     Hyperlipidemia Maternal Grandmother     Cancer Maternal Grandmother         breast    Diabetes Mother     Heart Disease Mother     Hypertension Mother     Hyperlipidemia Mother     Lung Disease Mother         smoker    Alcohol/Drug Mother         etoh    Psychiatric Illness Sister         anxiety    Heart Disease Maternal Grandfather     Hypertension Maternal Grandfather     Hyperlipidemia Maternal Grandfather     Arthritis Paternal Grandmother         osteoporosis    Psychiatric Illness Paternal Grandfather         sucide    Psychiatric Illness Sister         depression    Lung Disease Maternal Uncle         Throat, lung    Cancer Maternal Uncle         throat, lung    Alcohol/Drug Maternal Uncle         etoh       SOCIAL HISTORY  Social History     Tobacco Use    Smoking status: Some Days     Packs/day: 1.00     Years: 5.00     Pack years: 5.00     Types: Cigarettes     Last attempt to quit: 4/29/2019     Years since quitting: 3.8    Smokeless tobacco: Never   Vaping Use    Vaping Use: Every day   Substance and Sexual Activity    Alcohol use: Yes     Comment: soc    Drug use: No     Types: Marijuana    Sexual activity: Yes     Partners: Male     Comment: nothing used-want pregnancy       CURRENT MEDICATIONS  Home Medications       Reviewed by Sanna Lyons R.N. (Registered Nurse) on 02/14/23 at 0853  Med List Status: Not Addressed     Medication Last Dose Status   Adapalene-Benzoyl Peroxide 0.1-2.5 % GEL  Active   albuterol (VENTOLIN OR PROVENTIL) 108 (90 BASE) MCG/ACT AERS inhalation aerosol  Active   buPROPion (WELLBUTRIN) 100 MG Tab   Active   cetirizine (ZYRTEC) 10 MG Tab  Active   citalopram (CELEXA) 20 MG Tab  Active   clindamycin (CLEOCIN T) 1 % Gel  Active   cloNIDine (CATAPRES) 0.1 MG Tab  Active   doxycycline (VIBRAMYCIN) 100 MG TABS  Active   fluocinonide (LIDEX) 0.05 % CREA  Active   fluticasone-salmeterol (ADVAIR) 100-50 MCG/DOSE AEPB  Active   ibuprofen (MOTRIN) 800 MG Tab  Active   ketoconazole (NIZORAL) 2 % Cream  Active   ketotifen (ZADITOR) 0.025 % ophthalmic solution  Active   mupirocin (BACTROBAN) 2 % Ointment  Active                    ALLERGIES  No Known Allergies    PHYSICAL EXAM  VITAL SIGNS: BP (!) 141/97   Pulse 86   Temp 36.8 °C (98.2 °F) (Temporal)   Resp 16   Wt 83.9 kg (184 lb 15.5 oz)   LMP  (LMP Unknown)   SpO2 99%   BMI 27.31 kg/m²    Constitutional: Awake alert anxious nontoxic no acute distress.  HENT: Normocephalic, Atraumatic, Bilateral external ears normal,   Eyes: PERRL, EOMI, Conjunctiva normal, No discharge.   Neck: Normal range of motion, No tenderness, Supple, No stridor.   Cardiovascular: Normal heart rate, Normal rhythm, No murmurs, No rubs, No gallops.   Thorax & Lungs: Normal breath sounds, No respiratory distress, No wheezing, No chest tenderness.   Abdomen: Bowel sounds normal, Soft, No tenderness  Skin: Warm, Dry, No erythema, No rash.   Back: No tenderness, No CVA tenderness.   Musculoskeletal: Good range of motion in all major joints.   Neurologic: Alert,, No focal deficits noted.   Psychiatric: Affect normal      DIAGNOSTIC STUDIES / PROCEDURES  Results for orders placed or performed during the hospital encounter of 02/14/23   CBC WITH DIFFERENTIAL   Result Value Ref Range    WBC 9.1 4.8 - 10.8 K/uL    RBC 4.14 (L) 4.20 - 5.40 M/uL    Hemoglobin 12.1 12.0 - 16.0 g/dL    Hematocrit 36.5 (L) 37.0 - 47.0 %    MCV 88.2 81.4 - 97.8 fL    MCH 29.2 27.0 - 33.0 pg    MCHC 33.2 (L) 33.6 - 35.0 g/dL    RDW 39.7 35.9 - 50.0 fL    Platelet Count 217 164 - 446 K/uL    MPV 10.4 9.0 - 12.9 fL     Neutrophils-Polys 67.10 44.00 - 72.00 %    Lymphocytes 26.10 22.00 - 41.00 %    Monocytes 5.30 0.00 - 13.40 %    Eosinophils 1.00 0.00 - 6.90 %    Basophils 0.20 0.00 - 1.80 %    Immature Granulocytes 0.30 0.00 - 0.90 %    Nucleated RBC 0.00 /100 WBC    Neutrophils (Absolute) 6.08 2.00 - 7.15 K/uL    Lymphs (Absolute) 2.37 1.00 - 4.80 K/uL    Monos (Absolute) 0.48 0.00 - 0.85 K/uL    Eos (Absolute) 0.09 0.00 - 0.51 K/uL    Baso (Absolute) 0.02 0.00 - 0.12 K/uL    Immature Granulocytes (abs) 0.03 0.00 - 0.11 K/uL    NRBC (Absolute) 0.00 K/uL   COMP METABOLIC PANEL   Result Value Ref Range    Sodium 135 135 - 145 mmol/L    Potassium 3.8 3.6 - 5.5 mmol/L    Chloride 101 96 - 112 mmol/L    Co2 22 20 - 33 mmol/L    Anion Gap 12.0 7.0 - 16.0    Glucose 90 65 - 99 mg/dL    Bun 13 8 - 22 mg/dL    Creatinine 0.61 0.50 - 1.40 mg/dL    Calcium 9.7 8.5 - 10.5 mg/dL    AST(SGOT) 18 12 - 45 U/L    ALT(SGPT) 12 2 - 50 U/L    Alkaline Phosphatase 71 30 - 99 U/L    Total Bilirubin 0.3 0.1 - 1.5 mg/dL    Albumin 4.6 3.2 - 4.9 g/dL    Total Protein 7.2 6.0 - 8.2 g/dL    Globulin 2.6 1.9 - 3.5 g/dL    A-G Ratio 1.8 g/dL   LIPASE   Result Value Ref Range    Lipase 18 11 - 82 U/L   HCG QUANTITATIVE   Result Value Ref Range    Bhcg 1303.0 (H) 0.0 - 5.0 mIU/mL   CORRECTED CALCIUM   Result Value Ref Range    Correct Calcium 9.2 8.5 - 10.5 mg/dL   ESTIMATED GFR   Result Value Ref Range    GFR (CKD-EPI) 125 >60 mL/min/1.73 m 2   RH TYPE FOR RHOGAM FROM E.D.   Result Value Ref Range    Emergency Department Rh Typing NEG     Number Of Rh Doses Indicated 1    ACTION: RH IMMUNE GLOBULIN   Result Value Ref Range    Action  Rh Immune Globulin [id number]       issued       1 [facility]         RADIOLOGY  I have independently interpreted the diagnostic imaging associated with this visit and am waiting the final reading from the radiologist.     Radiologist interpretation:   US-OB 1ST TRIMESTER WITH TRANSVAGINAL (COMBO)   Final Result      No intrauterine  gestation is identified.  Differential considerations include early gestation, missed , and nonvisualized ectopic pregnancy.  in progress is also a consideration with this appearance. Close clinical monitoring is therefore    warranted with followup ultrasound or serial Beta HCG levels.            COURSE & MEDICAL DECISION MAKING    ED Observation Status? Yes; I am placing the patient in to an observation status due to a diagnostic uncertainty as well as therapeutic intensity. Patient placed in observation status at 1002AM, 2023.     Observation plan is as follows: Patient admitted to ED observation.  Be observed while being worked up for miscarriage versus threatened miscarriage or ectopic.    Upon Reevaluation, the patient's condition has: Improved; and will be discharged.    Patient discharged from ED Observation status at 1400 (Time)  (Date).     INITIAL ASSESSMENT, COURSE AND PLAN  Care Narrative:     Patient really presents with 2 separate complaints with the first complaint is that she is concerned about an assault she was in altercation where she was pushed and someone spat up on her.  She is concerned this may be contributing to her vaginal bleeding and possible miscarriage.  Denies any direct blows to her abdomen.  Denies any injury to her head neck or chest.  This was a few days ago.  She states she has made a police report.    Second complaint is vaginal bleeding and possible early pregnancy.  She has a an LMP in mid December.  She has had positive home pregnancy test.  Her work-up was initially including labs and ultrasound.      Today there is a significant delay because the quantitative beta-hCG machine was not working here.  Said to be sent to [facility] that was lost to be redrawn and recollected.  Is obviously was a source of some frustration for the patient.      The patient was worked up.  She has an ultrasound that shows an empty uterus.  Her history is  consistent passing of tissue and she is convinced she passed the fetus.  This certainly may be true.    She also complains of dysuria she is refusing further examination Pelvic exam or urinalysis.  She is refusing any further work-up or treatment.  She would like to go.  Fairly insistent upon her staying for quantitative beta-hCG to help us determine the duration of her pregnancy and help us guide her through this process and I had lengthy discussions on multiple occasions explaining the need for this and the need for serial quantitative beta hCGs.  Ultimately she was willing to stay.      Quantitative beta-hCG came back.  Rh is negative    Ordered RhoGAM.  She has refused this.  She has been working on her phone the possible side effects and she adamantly refuses.  I spoke at length with her and her partner who is in the room and they feel that both have Rh- blood and I do not want to have this injection.    Explained her that this could prevent her from getting pregnancy or carrying a pregnancy result in fetal harm or death maybe she could never have a baby if she has isoimmunization..  She verbalized understanding despite my best efforts I could not get her to agree to this.  She still referred to OB/GYN.    She also refused to give us a urinalysis.  She has dysuria she states it hurts when she pees but she does not want us to check.  It is prudent to treat her with a antibiotic to see if an early pregnancy so I have written her for Omnicef.  Counseled that she should take this antibiotic she has some reluctance but she states she will comply.    I placed a referral to [facility] and the patient verbalized that she understands that she can comply.  She understands that she should return for quantitative hCG and repeat examination if she has increasing pain or bleeding or other concerns left follow-up with [facility].  I suspect she has an completed .        ADDITIONAL PROBLEM  LIST    DISPOSITION AND DISCUSSIONS  I have discussed management of the patient with the following physicians and CORRINE's: None      Escalation of care considered, and ultimately not performed: Considered IV fluids also consider urinalysis for complete work-up.  She refuses this.    Barriers to care at this time, including but not limited to: Patient does not of a primary care or OB/GYN doctor.      The patient was here for prolonged period time for the lab challenges.  They have expressed great frustration but despite my best efforts I cannot convince her that she should stay.  Is in her best interest to get the RhoGAM and get a urinalysis but she does not want to stay.  Discussed with her risk of challenges with previous pregnancy maybe not being able to get pregnancy or carry a baby to term, having fetal harm, or possibly having other complications.  Also have discussed possible UTI causing infection.  She verbalized understanding will sign out AGAINST MEDICAL ADVICE.      [facility]  [id number]  [place] [address]  [phone]  Schedule an appointment as soon as possible for a visit in 2 days          FINAL DIAGNOSIS  1. Vaginal bleeding    2. Threatened miscarriage    3. Dysuria       4.  Signed out AGAINST MEDICAL ADVICE    Electronically signed by: Chaka Urban M.D., 2/14/2023 10:10 AM

## 2023-02-14 NOTE — ED NOTES
This RN called lab for update on HCG levels.  Pt lab, specimen had to be sent out to [facility] for processing.

## 2023-02-14 NOTE — ED NOTES
from lab called with a critical result of beta hcg = 1303 at 1315.  Lab is having an issue filing the result in Epic, will fax over copy of lab result.  Dr. Urban notified of critical lab result at 1325.  Critical lab result read back by .

## 2023-02-14 NOTE — ED NOTES
ERP at bedside to educate pt on rhogam shot.  Pt still refusing shot and to provide urine sample.  Per Dr. Urban, pt is an AMA discharge due to refusal of treatment.

## 2023-02-14 NOTE — PROGRESS NOTES
Spiritual Care Note    Patient Information     Patient's Name: Tia Marie Cryer   MRN: [id number]    YOB: 1995   Age and Gender: 27 y.o. female   Service Area: ED [facility]   Room (and Bed):  21/21 [facility]   Ethnicity or Nationality:     Primary Language: English   Jewish/Spiritual preference: Yazdanism   Place of Residence: [place]   Family/Friends/Others Present: Yes   Clinical Team Present: No   Medical Diagnosis(-es)/Procedure(s):    Code Status: No Order    Date of Admission: 2/14/2023   Length of Stay: 0 days          Spiritual Care Provider Information:  Name of Spiritual Care Provider: Kayla Perea  Title of Spiritual Care Provider: Volunteer   Phone Number: [phone]  E-mail: [email or web address]   Total time : 15 minutes    Spiritual Screen Results:    Gen Nursing        Palliative Care         Encounter/Request Information  Encounter/Request Type   Visited With: Patient and family together  Nature of the Visit: Initial, On shift  General Visit: Yes  Referral From/ Origin of Request: [place] rounds, Verbal patient    Religous Needs/Values       Spiritual Assessment   Spiritual Care Encounters  Observations/Symptoms: Anger/Resentment, Discouraged, Frustration, Tearful  Interacton/Conversation: Pt and significant other very upset by long wait and confusion over medical instructions. They vented at length. This  provided empthatic listening.  Assessment: Distress  Distress: Anger/Resentment/Bitterness, Conflict between Beliefs, Values and Recommended Treatment, Coping, Grief/Loss/Bereavement  Interventions: Compassionate presence, empathetic listening.  Outcomes: Ability to Communicate with Truth and Honesty  Plan: Visit Upon Request    Notes:

## 2023-02-14 NOTE — ED NOTES
Rounded on pt. Pt updated that we are waiting on HCG results. [facility] called for update on blood specimen.  Per [facility] they have not received the blood yet.  Pt offered for repeat draw for gold top tube to speed up result process. Pt refused repeat draw. Pt educated on POC and that HCG result and that urine sample are the last labs needed to result.  Per pt she cannot urinate due to not having any water. Dr. Urban updated. Pt provided with water per Dr. Urban orders. Dr. Urban to speak with pt.

## 2023-02-14 NOTE — DISCHARGE INSTRUCTIONS
Take antibiotics as prescribed for possible UTI.  Follow-up with GYN. Return for pain bleeding or other concerns.

## 2023-02-14 NOTE — ED NOTES
Pt provided with discharge paperwork and educated on antibiotic use.  Pt educated to return to the ED for worsening symptoms and to follow up with and OB/GYN.  AMA paperwork signed by pt.  Pt ambulatory out to lobby with steady gait. Going home with significant other.

## 2023-07-15 ENCOUNTER — HOSPITAL ENCOUNTER (EMERGENCY)
Facility: MEDICAL CENTER | Age: 28
End: 2023-07-15
Attending: EMERGENCY MEDICINE
Payer: MEDICAID

## 2023-07-15 VITALS
OXYGEN SATURATION: 94 % | WEIGHT: 188.49 LBS | DIASTOLIC BLOOD PRESSURE: 62 MMHG | RESPIRATION RATE: 16 BRPM | SYSTOLIC BLOOD PRESSURE: 119 MMHG | BODY MASS INDEX: 27.84 KG/M2 | TEMPERATURE: 97 F | HEART RATE: 86 BPM

## 2023-07-15 DIAGNOSIS — K08.89 PAIN, DENTAL: ICD-10-CM

## 2023-07-15 DIAGNOSIS — K04.7 DENTAL INFECTION: ICD-10-CM

## 2023-07-15 PROCEDURE — A9270 NON-COVERED ITEM OR SERVICE: HCPCS | Mod: UD | Performed by: EMERGENCY MEDICINE

## 2023-07-15 PROCEDURE — 99283 EMERGENCY DEPT VISIT LOW MDM: CPT

## 2023-07-15 PROCEDURE — 700102 HCHG RX REV CODE 250 W/ 637 OVERRIDE(OP): Mod: UD | Performed by: EMERGENCY MEDICINE

## 2023-07-15 RX ORDER — AMOXICILLIN AND CLAVULANATE POTASSIUM 875; 125 MG/1; MG/1
1 TABLET, FILM COATED ORAL ONCE
Status: COMPLETED | OUTPATIENT
Start: 2023-07-15 | End: 2023-07-15

## 2023-07-15 RX ORDER — AMOXICILLIN AND CLAVULANATE POTASSIUM 875; 125 MG/1; MG/1
1 TABLET, FILM COATED ORAL 2 TIMES DAILY
Qty: 14 TABLET | Refills: 0 | Status: ACTIVE | OUTPATIENT
Start: 2023-07-15 | End: 2023-07-22

## 2023-07-15 RX ADMIN — AMOXICILLIN AND CLAVULANATE POTASSIUM 1 TABLET: 875; 125 TABLET, FILM COATED ORAL at 09:47

## 2023-07-15 ASSESSMENT — FIBROSIS 4 INDEX: FIB4 SCORE: .6704712803492428233

## 2023-07-15 NOTE — ED NOTES
Pt discharged, all appropriate hospital equipment removed (IV, monitor, pulse ox, etc.). Pt left unit via walking with partner to home. Personal belongings with pt when leaving unit. Pt given discharge instructions prior to leaving unit including where to  prescriptions and when to follow-up; verbalizes understanding. Pt informed to return to ED if symptoms worsen/return or altered status develop. Copy of discharge instructions signed and turned into DC basket and copy sent with pt. F/u with oral MD
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
## 2023-07-15 NOTE — ED NOTES
Pt states has been taking antibiotics for x1 day, swelling to L upper cheek/tooth abscess to multiple teeth

## 2023-07-15 NOTE — ED TRIAGE NOTES
"Chief Complaint   Patient presents with    Oral Swelling     Pt reports she has an abscess in L side upper jaw that she can feel under her eye.  Uncertain if she has had a fever.     Pt is taking \"fish amoxicillin\" per her report since the pain started.    "